# Patient Record
Sex: MALE | Race: WHITE | NOT HISPANIC OR LATINO | Employment: FULL TIME | URBAN - METROPOLITAN AREA
[De-identification: names, ages, dates, MRNs, and addresses within clinical notes are randomized per-mention and may not be internally consistent; named-entity substitution may affect disease eponyms.]

---

## 2017-01-05 ENCOUNTER — OFFICE VISIT (OUTPATIENT)
Dept: FAMILY MEDICINE | Facility: CLINIC | Age: 27
End: 2017-01-05
Payer: COMMERCIAL

## 2017-01-05 VITALS
HEART RATE: 86 BPM | RESPIRATION RATE: 20 BRPM | DIASTOLIC BLOOD PRESSURE: 72 MMHG | TEMPERATURE: 98.6 F | OXYGEN SATURATION: 97 % | WEIGHT: 212 LBS | HEIGHT: 71 IN | BODY MASS INDEX: 29.68 KG/M2 | SYSTOLIC BLOOD PRESSURE: 126 MMHG

## 2017-01-05 DIAGNOSIS — Z00.00 ENCOUNTER FOR ROUTINE ADULT HEALTH EXAMINATION WITHOUT ABNORMAL FINDINGS: Primary | ICD-10-CM

## 2017-01-05 DIAGNOSIS — Z23 NEED FOR VACCINATION: ICD-10-CM

## 2017-01-05 DIAGNOSIS — J30.1 SEASONAL ALLERGIC RHINITIS DUE TO POLLEN: ICD-10-CM

## 2017-01-05 DIAGNOSIS — Z23 NEED FOR PROPHYLACTIC VACCINATION AND INOCULATION AGAINST INFLUENZA: ICD-10-CM

## 2017-01-05 DIAGNOSIS — J45.30 MILD PERSISTENT ASTHMA WITHOUT COMPLICATION: ICD-10-CM

## 2017-01-05 PROCEDURE — 99395 PREV VISIT EST AGE 18-39: CPT | Mod: 25 | Performed by: FAMILY MEDICINE

## 2017-01-05 PROCEDURE — 90732 PPSV23 VACC 2 YRS+ SUBQ/IM: CPT | Performed by: FAMILY MEDICINE

## 2017-01-05 PROCEDURE — 90471 IMMUNIZATION ADMIN: CPT | Performed by: FAMILY MEDICINE

## 2017-01-05 PROCEDURE — 90472 IMMUNIZATION ADMIN EACH ADD: CPT | Performed by: FAMILY MEDICINE

## 2017-01-05 PROCEDURE — 90688 IIV4 VACCINE SPLT 0.5 ML IM: CPT | Performed by: FAMILY MEDICINE

## 2017-01-05 RX ORDER — MONTELUKAST SODIUM 10 MG/1
10 TABLET ORAL AT BEDTIME
Qty: 90 TABLET | Refills: 3 | Status: SHIPPED | OUTPATIENT
Start: 2017-01-05 | End: 2017-12-26

## 2017-01-05 RX ORDER — INHALER, ASSIST DEVICES
1 SPACER (EA) MISCELLANEOUS PRN
Qty: 1 EACH | Refills: 0 | Status: SHIPPED | OUTPATIENT
Start: 2017-01-05 | End: 2018-08-03

## 2017-01-05 RX ORDER — ALBUTEROL SULFATE 90 UG/1
1-2 AEROSOL, METERED RESPIRATORY (INHALATION) EVERY 6 HOURS PRN
Qty: 1 INHALER | Refills: 1 | Status: SHIPPED | OUTPATIENT
Start: 2017-01-05 | End: 2017-12-26

## 2017-01-05 ASSESSMENT — PAIN SCALES - GENERAL: PAINLEVEL: NO PAIN (0)

## 2017-01-05 NOTE — NURSING NOTE
"Chief Complaint   Patient presents with     Physical       Initial /72 mmHg  Pulse 86  Temp(Src) 98.6  F (37  C)  Resp 20  Ht 5' 11\" (1.803 m)  Wt 212 lb (96.163 kg)  BMI 29.58 kg/m2  SpO2 97% Estimated body mass index is 29.58 kg/(m^2) as calculated from the following:    Height as of this encounter: 5' 11\" (1.803 m).    Weight as of this encounter: 212 lb (96.163 kg).  BP completed using cuff size: regular    "

## 2017-01-05 NOTE — PROGRESS NOTES
Injectable Influenza Immunization Documentation    1.  Is the person to be vaccinated sick today?  No    2. Does the person to be vaccinated have an allergy to eggs or to a component of the vaccine?  No    3. Has the person to be vaccinated today ever had a serious reaction to influenza vaccine in the past?  No    4. Has the person to be vaccinated ever had Guillain-[place] syndrome?  No     Form completed by gino

## 2017-01-05 NOTE — PROGRESS NOTES
SUBJECTIVE:     CC: Garrison Hastings is an 26 year old male who presents for preventative health visit.     Healthy Habits:    Do you get at least three servings of calcium containing foods daily (dairy, green leafy vegetables, etc.)? yes    Amount of exercise or daily activities, outside of work: 2 times per week.     Problems taking medications regularly No    Medication side effects: No    Have you had an eye exam in the past two years? Yes, coming due.     Do you see a dentist twice per year? yes    Do you have sleep apnea, excessive snoring or daytime drowsiness?no        He has a history of allergies and asthma. He uses Albuterol rarely, does not need a daily inhaler.  However, does use Singulair daily for management of allergies (and probably some asthma benefit).    He uses his albuterol more when his seasonal allergies flare in fall, and he takes Zyrtec to help manage those symptoms as well         Today's PHQ-2 Score:   PHQ-2 ( 1999 Pfizer) 1/5/2017 6/6/2016   Q1: Little interest or pleasure in doing things 0 0   Q2: Feeling down, depressed or hopeless 0 0   PHQ-2 Score 0 0       Abuse: Current or Past(Physical, Sexual or Emotional)- No  Do you feel safe in your environment - Yes    Social History   Substance Use Topics     Smoking status: Never Smoker      Smokeless tobacco: Never Used     Alcohol Use: 0.0 oz/week     0 Standard drinks or equivalent per week      Comment: 1-2/week     The patient does not drink >3 drinks per day nor >7 drinks per week.      Reviewed orders with patient. Reviewed health maintenance and updated orders accordingly - Yes    All Histories reviewed and updated in Epic.      ROS:  C: NEGATIVE for fever, chills, change in weight  I: NEGATIVE for worrisome rashes, moles or lesions  E: NEGATIVE for vision changes or irritation  ENT: NEGATIVE for ear, mouth and throat problems  R: NEGATIVE for significant cough or SOB.  Asthma management as noted above.  CV: NEGATIVE for chest  "pain, palpitations or peripheral edema  GI: NEGATIVE for nausea, abdominal pain, heartburn, or change in bowel habits   male: negative for dysuria, hematuria, decreased urinary stream, erectile dysfunction, urethral discharge  M: NEGATIVE for significant arthralgias or myalgia  N: NEGATIVE for weakness, dizziness or paresthesias  P: NEGATIVE for changes in mood or affect    Problem list, Medication list, Allergies, and Medical/Social/Surgical histories reviewed in [facility] and updated as appropriate.  OBJECTIVE:     /72 mmHg  Pulse 86  Temp(Src) 98.6  F (37  C)  Resp 20  Ht 5' 11\" (1.803 m)  Wt 212 lb (96.163 kg)  BMI 29.58 kg/m2  SpO2 97%  EXAM:  GENERAL: healthy, alert and no distress  EYES: Eyes grossly normal to inspection, PERRL and conjunctivae and sclerae normal  HENT: ear canals and TM's normal, nose and mouth without ulcers or lesions - left ear has cerumen build up   NECK: no adenopathy, no asymmetry, masses, or scars and thyroid normal to palpation  RESP: lungs clear to auscultation - no rales, rhonchi or wheezes  CV: regular rate and rhythm, normal S1 S2, no S3 or S4, no murmur, click or rub, no peripheral edema and peripheral pulses strong  ABDOMEN: soft, nontender, no hepatosplenomegaly, no masses and bowel sounds normal  MS: no gross musculoskeletal defects noted, no edema  SKIN: no suspicious lesions or rashes  NEURO: Normal strength and tone, mentation intact and speech normal  PSYCH: mentation appears normal, affect normal/bright  LYMPH: normal ant/post cervical, supraclavicular lymph nodes     ASSESSMENT/PLAN:         ICD-10-CM    1. Encounter for routine adult health examination without abnormal findings Z00.00    2. Need for prophylactic vaccination and inoculation against influenza Z23 FLU VACCINE, 3 YRS +, IM (QUADRIVALENT W/PRESERVATIVES/MULTI-DOSE) [66632]     Vaccine Administration, Initial [52790]   3. Mild persistent asthma without complication J45.30 Spacer/Aero-Holding " "Chambers (AEROCHAMBER MAX W/FLOW-VU) MISC     albuterol (PROAIR HFA/PROVENTIL HFA/VENTOLIN HFA) 108 (90 BASE) MCG/ACT Inhaler     montelukast (SINGULAIR) 10 MG tablet   4. Seasonal allergic rhinitis due to pollen J30.1    5. Need for vaccination Z23 Pneumococcal vaccine 23 valent (Pneumovax) [12758]     Each additional admin.  (Right click and add QUANTITY)  [93699]       PLAN:  1. I attempted to remove myself with cerumen spoon but, was unable. Nurse flushed his left   2. Pneumonia vaccine given     COUNSELING:  Reviewed preventive health counseling, as reflected in patient instructions       reports that he has never smoked. He has never used smokeless tobacco.    Estimated body mass index is 29.58 kg/(m^2) as calculated from the following:    Height as of this encounter: 5' 11\" (1.803 m).    Weight as of this encounter: 212 lb (96.163 kg).   Weight management plan: Discussed healthy diet and exercise guidelines and patient will follow up in 12 months in clinic to re-evaluate.    Counseling Resources:  ATP IV Guidelines  Pooled Cohorts Equation Calculator  FRAX Risk Assessment  ICSI Preventive Guidelines  Dietary Guidelines for Americans, 2010  USDA's MyPlate  ASA Prophylaxis  Lung CA Screening    This document serves as a record of the services and decisions personally performed and made by Omid Dodge MD.It was created on his behalf by Bobbi Vincent,  trained medical scribes. The creation of this document is based the provider's statements to the medical scribe. January 5, 2017 2:52 PM    The information in this document, created by the medical scribe for me, accurately reflects the services I personally performed and the decisions made by me. I have reviewed and approved this document for accuracy.     Omid Dodge MD   [facility]        "

## 2017-01-05 NOTE — NURSING NOTE
Prior to injection verified patient identity using patient's name and date of birth.  Per orders of Dr. Dodge injection of Pneumovax h given by Milly Galvan MA. Patient instructed to remain in clinic for 20 minutes afterwards and to report any adverse reaction to me immediately.         Screening Questionnaire for Adult Immunization    Are you sick today?   No   Do you have allergies to medications, food, a vaccine component or latex?   No   Have you ever had a serious reaction after receiving a vaccination?   No   Do you have a long-term health problem with heart disease, lung disease, asthma, kidney disease, metabolic disease (e.g. diabetes), anemia, or other blood disorder?   No   Do you have cancer, leukemia, HIV/AIDS, or any other immune system problem?   No   In the past 3 months, have you taken medications that affect  your immune system, such as prednisone, other steroids, or anticancer drugs; drugs for the treatment of rheumatoid arthritis, Crohn s disease, or psoriasis; or have you had radiation treatments?   No   Have you had a seizure, or a brain or other nervous system problem?   No   During the past year, have you received a transfusion of blood or blood     products, or been given immune (gamma) globulin or antiviral drug?   No   For women: Are you pregnant or is there a chance you could become        pregnant during the next month?   No   Have you received any vaccinations in the past 4 weeks?   No     Immunization questionnaire answers were all negative.      MNVFC doesn't apply on this patient      Screening performed by Estela Galvan on 1/5/2017 at 4:22 PM.

## 2017-01-05 NOTE — Clinical Note
My Asthma Action Plan  Name: Garrison Hastings   YOB: 1990  Date: 1/5/2017   My doctor: Omid Dodge   My clinic: [facility]      My Control Medicine: none  My Rescue Medicine: Albuterol (Proair/Ventolin/Proventil) HFA     My Asthma Severity: mild persistent  Avoid your asthma triggers: upper respiratory infections and pollens        GREEN ZONE   Good Control    I feel good    No cough or wheeze    Can work, sleep and play without asthma symptoms       Take your asthma control medicine every day.     1. If exercise triggers your asthma, take your rescue medication    15 minutes before exercise or sports, and    During exercise if you have asthma symptoms  2. Spacer to use with inhaler: If you have a spacer, make sure to use it with your inhaler             YELLOW ZONE Getting Worse  I have ANY of these:    I do not feel good    Cough or wheeze    Chest feels tight    Wake up at night   1. Keep taking your Green Zone medications  2. Start taking your rescue medicine:    every 20 minutes for up to 1 hour. Then every 4 hours for 24-48 hours.  3. If you stay in the Yellow Zone for more than 12-24 hours, contact your doctor.  4. If you do not return to the Green Zone in 12-24 hours or you get worse, start taking your oral steroid medicine if prescribed by your provider.           RED ZONE Medical Alert - Get Help  I have ANY of these:    I feel awful    Medicine is not helping    Breathing getting harder    Trouble walking or talking    Nose opens wide to breathe       1. Take your rescue medicine NOW  2. If your provider has prescribed an oral steroid medicine, start taking it NOW  3. Call your doctor NOW  4. If you are still in the Red Zone after 20 minutes and you have not reached your doctor:    Take your rescue medicine again and    Call 911 or go to the emergency room right away    See your regular doctor within 2 weeks of an Emergency Room or Urgent Care visit for follow-up  treatment.        The above medication may be given at school or day care?: N/A (Adult Patient)  Child can carry and use inhaler(s) at school with approval of school nurse?: N/A (Adult Patient)    Electronically signed by: Omid Dodge, January 5, 2017    Annual Reminders:  Meet with Asthma Educator,  Flu Shot in the Fall, consider Pneumonia Vaccination for patients with asthma (aged 19 and older).    Pharmacy: [address]                     Asthma Triggers  How To Control Things That Make Your Asthma Worse    Triggers are things that make your asthma worse.  Look at the list below to help you find your triggers and what you can do about them.  You can help prevent asthma flare-ups by staying away from your triggers.      Trigger                                                          What you can do   Cigarette Smoke  Tobacco smoke can make asthma worse. Do not allow smoking in your home, car or around you.  Be sure no one smokes at a child s day care or school.  If you smoke, ask your health care provider for ways to help you quit.  Ask family members to quit too.  Ask your health care provider for a referral to Quit Plan to help you quit smoking, or call 3-785-594-PLAN.     Colds, Flu, Bronchitis  These are common triggers of asthma. Wash your hands often.  Don t touch your eyes, nose or mouth.  Get a flu shot every year.     Dust Mites  These are tiny bugs that live in cloth or carpet. They are too small to see. Wash sheets and blankets in hot water every week.   Encase pillows and mattress in dust mite proof covers.  Avoid having carpet if you can. If you have carpet, vacuum weekly.   Use a dust mask and HEPA vacuum.   Pollen and Outdoor Mold  Some people are allergic to trees, grass, or weed pollen, or molds. Try to keep your windows closed.  Limit time out doors when pollen count is high.   Ask you health care provider about taking medicine during  allergy season.     Animal Dander  Some people are allergic to skin flakes, urine or saliva from pets with fur or feathers. Keep pets with fur or feathers out of your home.    If you can t keep the pet outdoors, then keep the pet out of your bedroom.  Keep the bedroom door closed.  Keep pets off cloth furniture and away from stuffed toys.     Mice, Rats, and Cockroaches  Some people are allergic to the waste from these pests.   Cover food and garbage.  Clean up spills and food crumbs.  Store grease in the refrigerator.   Keep food out of the bedroom.   Indoor Mold  This can be a trigger if your home has high moisture. Fix leaking faucets, pipes, or other sources of water.   Clean moldy surfaces.  Dehumidify basement if it is damp and smelly.   Smoke, Strong Odors, and Sprays  These can reduce air quality. Stay away from strong odors and sprays, such as perfume, powder, hair spray, paints, smoke incense, paint, cleaning products, candles and new carpet.   Exercise or Sports  Some people with asthma have this trigger. Be active!  Ask your doctor about taking medicine before sports or exercise to prevent symptoms.    Warm up for 5-10 minutes before and after sports or exercise.     Other Triggers of Asthma  Cold air:  Cover your nose and mouth with a scarf.  Sometimes laughing or crying can be a trigger.  Some medicines and food can trigger asthma.

## 2017-01-05 NOTE — MR AVS SNAPSHOT
After Visit Summary   1/5/2017    Garrison Hastings    MRN: [id number]           Patient Information     Date Of Birth          1990        Visit Information        Provider Department      1/5/2017 2:30 PM Omid Dodge MD [facility]        Today's Diagnoses     Encounter for routine adult health examination without abnormal findings    -  1     Need for prophylactic vaccination and inoculation against influenza         Mild persistent asthma without complication         Seasonal allergic rhinitis due to pollen           Care Instructions      Preventive Health Recommendations  Male Ages 26 - 39    Yearly exam:             See your health care provider every year in order to  o   Review health changes.   o   Discuss preventive care.    o   Review your medicines if your doctor has prescribed any.    You should be tested each year for STDs (sexually transmitted diseases), if you re at risk.     After age 35, talk to your provider about cholesterol testing. If you are at risk for heart disease, have your cholesterol tested at least every 5 years.     If you are at risk for diabetes, you should have a diabetes test (fasting glucose).  Shots: Get a flu shot each year. Get a tetanus shot every 10 years.     Nutrition:    Eat at least 5 servings of fruits and vegetables daily.     Eat whole-grain bread, whole-wheat pasta and brown rice instead of white grains and rice.     Talk to your provider about Calcium and Vitamin D.     Lifestyle    Exercise for at least 150 minutes a week (30 minutes a day, 5 days a week). This will help you control your weight and prevent disease.     Limit alcohol to one drink per day.     No smoking.     Wear sunscreen to prevent skin cancer.     See your dentist every six months for an exam and cleaning.             Follow-ups after your visit        Who to contact     If you have questions or need follow up information about today's clinic visit or  "your schedule please contact [facility] directly at [phone].  Normal or non-critical lab and imaging results will be communicated to you by MyChart, letter or phone within 4 business days after the clinic has received the results. If you do not hear from us within 7 days, please contact the clinic through [employer] or phone. If you have a critical or abnormal lab result, we will notify you by phone as soon as possible.  Submit refill requests through [employer] or call your pharmacy and they will forward the refill request to us. Please allow 3 business days for your refill to be completed.          Additional Information About Your Visit        [employer] Information     [employer] lets you send messages to your doctor, view your test results, renew your prescriptions, schedule appointments and more. To sign up, go to [email or web address] . Click on \"Log in\" on the left side of the screen, which will take you to the Welcome page. Then click on \"Sign up Now\" on the right side of the page.     You will be asked to enter the access code listed below, as well as some personal information. Please follow the directions to create your username and password.     Your access code is: [email or web address]  Expires: 2017  4:03 PM     Your access code will  in 90 days. If you need help or a new code, please call your [facility] clinic or [phone].        Care EveryWhere ID     This is your Care EveryWhere ID. This could be used by other organizations to access your [facility] medical records  [id number]        Your Vitals Were     Pulse Temperature Respirations Height BMI (Body Mass Index) Pulse Oximetry    86 98.6  F (37  C) 20 5' 11\" (1.803 m) 29.58 kg/m2 97%       Blood Pressure from Last 3 Encounters:   17 126/72   16 136/74   12 140/69    Weight from Last 3 Encounters:   17 212 lb (96.163 kg)   16 210 lb (95.255 kg)   12 192 lb 12.8 oz (87.454 kg)              We " Performed the Following     Asthma Action Plan (AAP)     FLU VACCINE, 3 YRS +, IM (QUADRIVALENT W/PRESERVATIVES/MULTI-DOSE) [66896]     Vaccine Administration, Initial [35399]          Today's Medication Changes          These changes are accurate as of: 1/5/17  4:03 PM.  If you have any questions, ask your nurse or doctor.               Start taking these medicines.        Dose/Directions    AEROCHAMBER MAX W/FLOW-VU Misc   Used for:  Mild persistent asthma without complication   Started by:  Omid Dodge MD        Dose:  1 Device   1 Device as needed   Quantity:  1 each   Refills:  0         These medicines have changed or have updated prescriptions.        Dose/Directions    albuterol 108 (90 BASE) MCG/ACT Inhaler   Commonly known as:  PROAIR HFA/PROVENTIL HFA/VENTOLIN HFA   This may have changed:  how much to take   Used for:  Mild persistent asthma without complication   Changed by:  Omid Dodge MD        Dose:  1-2 puff   Inhale 1-2 puffs into the lungs every 6 hours as needed for shortness of breath / dyspnea or wheezing   Quantity:  1 Inhaler   Refills:  1         Stop taking these medicines if you haven't already. Please contact your care team if you have questions.     mometasone 110 MCG/INH inhaler   Commonly known as:  ASMANEX 30 METERED DOSES   Stopped by:  Omid Dodge MD                Where to get your medicines      These medications were sent to [facility] [address]   [address] , [facility] [address]     Phone:  [phone]    - albuterol 108 (90 BASE) MCG/ACT Inhaler  - montelukast 10 MG tablet      Some of these will need a paper prescription and others can be bought over the counter.  Ask your nurse if you have questions.     Bring a paper prescription for each of these medications    - AEROCHAMBER MAX W/FLOW-VU [facility]             Primary Care Provider Office Phone # Fax #    Omid Dodge [phone]  [phone]       [facility] [address] [place] [address]        Thank you!     Thank you for choosing [facility]  for your care. Our goal is always to provide you with excellent care. Hearing back from our patients is one way we can continue to improve our services. Please take a few minutes to complete the written survey that you may receive in the mail after your visit with us. Thank you!             Your Updated Medication List - Protect others around you: Learn how to safely use, store and throw away your medicines at www.disposemymeds.org.          This list is accurate as of: 1/5/17  4:03 PM.  Always use your most recent med list.                   Brand Name Dispense Instructions for use    AEROCHAMBER MAX W/FLOW-VU Misc     1 each    1 Device as needed       albuterol 108 (90 BASE) MCG/ACT Inhaler    PROAIR HFA/PROVENTIL HFA/VENTOLIN HFA    1 Inhaler    Inhale 1-2 puffs into the lungs every 6 hours as needed for shortness of breath / dyspnea or wheezing       cetirizine 10 MG tablet    zyrTEC     Take 10 mg by mouth daily       montelukast 10 MG tablet    SINGULAIR    90 tablet    Take 1 tablet (10 mg) by mouth At Bedtime

## 2017-01-06 ASSESSMENT — ASTHMA QUESTIONNAIRES: ACT_TOTALSCORE: 25

## 2017-12-11 ENCOUNTER — ALLIED HEALTH/NURSE VISIT (OUTPATIENT)
Dept: FAMILY MEDICINE | Facility: CLINIC | Age: 27
End: 2017-12-11
Payer: COMMERCIAL

## 2017-12-11 DIAGNOSIS — Z31.41 ENCOUNTER FOR FERTILITY TESTING: Primary | ICD-10-CM

## 2017-12-11 DIAGNOSIS — Z23 NEED FOR PROPHYLACTIC VACCINATION AND INOCULATION AGAINST INFLUENZA: ICD-10-CM

## 2017-12-11 PROCEDURE — 90471 IMMUNIZATION ADMIN: CPT

## 2017-12-11 PROCEDURE — 90688 IIV4 VACCINE SPLT 0.5 ML IM: CPT

## 2017-12-11 NOTE — PROGRESS NOTES

## 2017-12-11 NOTE — MR AVS SNAPSHOT
After Visit Summary   12/11/2017    Garrison Hastings    MRN: [id number]           Patient Information     Date Of Birth          1990        Visit Information        Provider Department      12/11/2017 11:15 AM NL FLOAT TEAM D [facility][note 3]        Today's Diagnoses     Encounter for fertility testing    -  1    Need for prophylactic vaccination and inoculation against influenza           Follow-ups after your visit        Future tests that were ordered for you today     Open Future Orders        Priority Expected Expires Ordered    Semen Analysis, Strict Morphology (JANNIE) Routine  3/30/2018 12/11/2017            Who to contact     If you have questions or need follow up information about today's clinic visit or your schedule please contact [facility] directly at [phone].  Normal or non-critical lab and imaging results will be communicated to you by OfferLoungehart, letter or phone within 4 business days after the clinic has received the results. If you do not hear from us within 7 days, please contact the clinic through OfferLoungehart or phone. If you have a critical or abnormal lab result, we will notify you by phone as soon as possible.  Submit refill requests through PROTEGO or call your pharmacy and they will forward the refill request to us. Please allow 3 business days for your refill to be completed.          Additional Information About Your Visit        MyChart Information     PROTEGO gives you secure access to your electronic health record. If you see a primary care provider, you can also send messages to your care team and make appointments. If you have questions, please call your primary care clinic.  If you do not have a primary care provider, please call [phone] and they will assist you.        Care EveryWhere ID     This is your Care EveryWhere ID. This could be used by other organizations to access your [place] medical records  [id number]         Blood  Pressure from Last 3 Encounters:   01/05/17 126/72   06/06/16 136/74   06/06/12 140/69    Weight from Last 3 Encounters:   01/05/17 212 lb (96.2 kg)   06/06/16 210 lb (95.3 kg)   06/06/12 192 lb 12.8 oz (87.5 kg)              We Performed the Following     FLU VACCINE, 3 YRS +, IM (QUADRIVALENT W/PRESERVATIVES/MULTI-DOSE) [10075]     Vaccine Administration, Initial [06567]        Primary Care Provider Office Phone # Fax #    Omidgina Dodge [phone] [address] [place] [address]        Equal Access to Services     JOANNA ORNELAS : Brando Schultz, wakristi muniz, qaybta kaalmada nelly, shannan gonzalez. So [facility] [phone].    ATENCIÓN: Si habla español, tiene a ramos disposición servicios gratuitos de asistencia lingüística. Llame al [phone].    We comply with applicable federal civil rights laws and [place] laws. We do not discriminate on the basis of race, color, national origin, age, disability, sex, sexual orientation, or gender identity.            Thank you!     Thank you for choosing [facility]  for your care. Our goal is always to provide you with excellent care. Hearing back from our patients is one way we can continue to improve our services. Please take a few minutes to complete the written survey that you may receive in the mail after your visit with us. Thank you!             Your Updated Medication List - Protect others around you: Learn how to safely use, store and throw away your medicines at www.disposemymeds.org.          This list is accurate as of: 12/11/17 12:46 PM.  Always use your most recent med list.                   Brand Name Dispense Instructions for use Diagnosis    AEROCHAMBER MAX W/FLOW-VU Misc     1 each    1 Device as needed    Mild persistent asthma without complication       albuterol 108 (90 BASE) MCG/ACT Inhaler    PROAIR HFA/PROVENTIL HFA/VENTOLIN HFA    1 Inhaler    Inhale  1-2 puffs into the lungs every 6 hours as needed for shortness of breath / dyspnea or wheezing    Mild persistent asthma without complication       cetirizine 10 MG tablet    zyrTEC     Take 10 mg by mouth daily        montelukast 10 MG tablet    SINGULAIR    90 tablet    Take 1 tablet (10 mg) by mouth At Bedtime    Mild persistent asthma without complication

## 2017-12-20 DIAGNOSIS — Z31.41 ENCOUNTER FOR FERTILITY TESTING: ICD-10-CM

## 2017-12-20 PROCEDURE — 89322 SEMEN ANAL STRICT CRITERIA: CPT

## 2017-12-26 ENCOUNTER — MYC REFILL (OUTPATIENT)
Dept: FAMILY MEDICINE | Facility: CLINIC | Age: 27
End: 2017-12-26

## 2017-12-26 DIAGNOSIS — J45.30 MILD PERSISTENT ASTHMA WITHOUT COMPLICATION: ICD-10-CM

## 2017-12-26 LAB
ABNORMAL SPERM: 88 MORPHOLOGY
ABSTINENCE DAYS: 7 DAYS (ref 2–7)
AGGLUTINATION: NO YES/NO
ANALYSIS TEMP - CENTIGRADE: 22 CENTIGRADE
CELL FRAGMENTS: NORMAL %
COLLECTION METHOD: NORMAL
COLLECTION SITE: NORMAL
CONSENT TO RELEASE TO PARTNER: YES
HEAD DEFECT: 83
IMMATURE SPERM: NORMAL %
IMMOTILE: 34 %
LAB RECEIPT TIME: NORMAL
LIQUEFIED: YES YES/NO
MIDPIECE DEFECT: 12
NON-PROGRESSIVE MOTILITY: 7 %
NORMAL SPERM: 12 % NORMAL FORMS (ref 4–?)
PROGRESSIVE MOTILITY: 59 % (ref 32–?)
ROUND CELLS: 0.2 MILLION/ML (ref ?–2)
SPECIMEN CONCENTRATION: 128 MILLION/ML (ref 15–?)
SPECIMEN PH: 7.2 PH (ref 7.2–?)
SPECIMEN TYPE: NORMAL
SPECIMEN VOL UR: 5.9 ML (ref 1.5–?)
TAIL DEFECT: 4
TIME OF ANALYSIS: NORMAL
TOTAL NUMBER: 755 MILLION (ref 39–?)
TOTAL PROGRESSIVE MOTILE: 445 MILLION (ref 15.6–?)
VISCOUS: NO YES/NO
VITALITY: NORMAL % (ref 58–?)
WBC SPECIMEN: NORMAL %

## 2017-12-26 RX ORDER — MONTELUKAST SODIUM 10 MG/1
10 TABLET ORAL AT BEDTIME
Qty: 90 TABLET | Refills: 0 | Status: SHIPPED | OUTPATIENT
Start: 2017-12-26 | End: 2018-04-09

## 2017-12-26 RX ORDER — ALBUTEROL SULFATE 90 UG/1
1-2 AEROSOL, METERED RESPIRATORY (INHALATION) EVERY 6 HOURS PRN
Qty: 1 INHALER | Refills: 0 | Status: SHIPPED | OUTPATIENT
Start: 2017-12-26 | End: 2018-04-09

## 2017-12-26 NOTE — PROGRESS NOTES
Magnus,  Your results all appear within normal limits.  Please let me know if you have any questions.    Sincerely,  Dr. Dodge

## 2017-12-26 NOTE — TELEPHONE ENCOUNTER
Message from Browserlinghart:  Original authorizing provider: Omid Dodge MD    Magnus Hastings would like a refill of the following medications:  albuterol (PROAIR HFA/PROVENTIL HFA/VENTOLIN HFA) 108 (90 BASE) MCG/ACT Inhaler [Omid Dodge MD]  montelukast (SINGULAIR) 10 MG tablet [Omid Dodge MD]    Preferred pharmacy: [address]     Comment:  I am about 1.5 weeks away from running out of my montelukast 10 MG and I'm going to have a hard time getting in for an appointment until the week of Jan 8ht-12 due to some work trade shows I am participating in. Could I get an extension/refill on my Montelukast and a refill on my inhaler please? I'd like to have an extra inhaler for my truck/traveling purposes. I'll be requesting an appointment for the week of the 8th-12th.

## 2017-12-27 ENCOUNTER — TELEPHONE (OUTPATIENT)
Dept: FAMILY MEDICINE | Facility: CLINIC | Age: 27
End: 2017-12-27

## 2017-12-28 ASSESSMENT — ASTHMA QUESTIONNAIRES: ACT_TOTALSCORE: 18

## 2018-01-08 ENCOUNTER — OFFICE VISIT (OUTPATIENT)
Dept: FAMILY MEDICINE | Facility: CLINIC | Age: 28
End: 2018-01-08
Payer: COMMERCIAL

## 2018-01-08 VITALS
HEART RATE: 74 BPM | BODY MASS INDEX: 30.1 KG/M2 | TEMPERATURE: 98.2 F | DIASTOLIC BLOOD PRESSURE: 64 MMHG | OXYGEN SATURATION: 100 % | RESPIRATION RATE: 18 BRPM | HEIGHT: 71 IN | SYSTOLIC BLOOD PRESSURE: 118 MMHG | WEIGHT: 215 LBS

## 2018-01-08 DIAGNOSIS — J45.30 MILD PERSISTENT ASTHMA WITHOUT COMPLICATION: ICD-10-CM

## 2018-01-08 DIAGNOSIS — Z00.01 ENCOUNTER FOR ROUTINE ADULT HEALTH EXAMINATION WITH ABNORMAL FINDINGS: Primary | ICD-10-CM

## 2018-01-08 DIAGNOSIS — J34.89 NASAL OBSTRUCTION: ICD-10-CM

## 2018-01-08 PROCEDURE — 99395 PREV VISIT EST AGE 18-39: CPT | Performed by: FAMILY MEDICINE

## 2018-01-08 ASSESSMENT — PAIN SCALES - GENERAL: PAINLEVEL: NO PAIN (0)

## 2018-01-08 NOTE — PROGRESS NOTES
SUBJECTIVE:   CC: Garrison Hastings is an 27 year old male who presents for preventative health visit.     Physical   Annual:     Getting at least 3 servings of Calcium per day::  Yes    Bi-annual eye exam::  Yes    Dental care twice a year::  NO    Sleep apnea or symptoms of sleep apnea::  None    Diet::  Low fat/cholesterol, Vegetarian/vegan and Gluten-free/reduced    Frequency of exercise::  1 day/week    Duration of exercise::  15-30 minutes    Taking medications regularly::  Yes    Additional concerns today::  YES            Feels like right side of nostril is plugged all the time.  No frequent nose bleeds.  Has been going on for a couple of years.        Today's PHQ-2 Score:   PHQ-2 ( 1999 Pfizer) 1/8/2018   Q1: Little interest or pleasure in doing things 0   Q2: Feeling down, depressed or hopeless 0   PHQ-2 Score 0   Q1: Little interest or pleasure in doing things Not at all   Q2: Feeling down, depressed or hopeless Not at all   PHQ-2 Score 0       Abuse: Current or Past(Physical, Sexual or Emotional)- No  Do you feel safe in your environment - Yes    Social History   Substance Use Topics     Smoking status: Never Smoker     Smokeless tobacco: Never Used     Alcohol use 0.0 oz/week     0 Standard drinks or equivalent per week      Comment: 1-2/week     Alcohol Use 1/8/2018   If you drink alcohol, do you typically have greater than 3 drinks per day OR greater than 7 drinks per week?   No   No flowsheet data found.      Last PSA: No results found for: PSA    Reviewed orders with patient. Reviewed health maintenance and updated orders accordingly - Yes  Labs reviewed in EPIC    Reviewed and updated as needed this visit by clinical staff  Tobacco  Allergies  Meds  Problems  Med Hx  Surg Hx  Fam Hx  Soc Hx          Reviewed and updated as needed this visit by Provider  Tobacco  Allergies  Meds  Problems  Med Hx  Surg Hx  Soc Hx           Review of Systems   Constitutional: Negative for chills, fatigue,  "fever and unexpected weight change.   HENT: Positive for congestion, rhinorrhea, sinus pain and sinus pressure. Negative for dental problem, ear pain, hearing loss, mouth sores, nosebleeds, postnasal drip, sore throat and trouble swallowing.    Eyes: Negative for pain and visual disturbance.   Respiratory: Positive for cough and shortness of breath. Negative for wheezing.         Asthma symptoms seem to be worsening over this past winter.  Patient seems to be using his albuterol more often.   Cardiovascular: Negative for chest pain, palpitations and peripheral edema.   Gastrointestinal: Negative for abdominal pain, constipation, diarrhea, heartburn, hematochezia, nausea and vomiting.   Endocrine: Negative for cold intolerance, heat intolerance and polyuria.   Genitourinary: Negative for decreased urine volume, difficulty urinating, discharge, dysuria, frequency, genital sores, hematuria, impotence, scrotal swelling, testicular pain and urgency.   Musculoskeletal: Negative for arthralgias, back pain, gait problem, joint swelling and myalgias.   Skin: Negative for rash.   Allergic/Immunologic: Negative for environmental allergies.   Neurological: Negative for dizziness, weakness, numbness, headaches and paresthesias.   Psychiatric/Behavioral: Negative for mood changes and sleep disturbance. The patient is not nervous/anxious.          OBJECTIVE:   /64  Pulse 74  Temp 98.2  F (36.8  C) (Temporal)  Resp 18  Ht 5' 11\" (1.803 m)  Wt 215 lb (97.5 kg)  SpO2 100%  BMI 29.99 kg/m2    Physical Exam   Constitutional: He is oriented to person, place, and time. He appears well-developed and well-nourished. He is active. No distress.   HENT:   Head: Normocephalic and atraumatic.   Right Ear: Hearing, tympanic membrane, external ear and ear canal normal.   Left Ear: Hearing, tympanic membrane, external ear and ear canal normal.   Mouth/Throat: Uvula is midline, oropharynx is clear and moist and mucous membranes are " normal. No oral lesions. No oropharyngeal exudate.   Eyes: Conjunctivae, EOM and lids are normal. Pupils are equal, round, and reactive to light. Right eye exhibits no discharge. Left eye exhibits no discharge. No scleral icterus.   Neck: Normal range of motion. Neck supple. No tracheal deviation present. No thyroid mass and no thyromegaly present.   Cardiovascular: Normal rate, regular rhythm, S1 normal, S2 normal, normal heart sounds and normal pulses.  Exam reveals no S3 and no S4.    No murmur heard.  Pulmonary/Chest: Effort normal and breath sounds normal. No respiratory distress. He has no wheezes. He has no rales.   Abdominal: Soft. Bowel sounds are normal. He exhibits no distension and no mass. There is no hepatosplenomegaly. There is no tenderness. There is no guarding.   Musculoskeletal: Normal range of motion. He exhibits no edema or deformity.   Lymphadenopathy:     He has no cervical adenopathy.        Right: No supraclavicular adenopathy present.        Left: No supraclavicular adenopathy present.   Neurological: He is alert and oriented to person, place, and time. He has normal strength and normal reflexes. He exhibits normal muscle tone.   Skin: Skin is warm and dry. No lesion and no rash noted.   Psychiatric: He has a normal mood and affect. His speech is normal. Judgment and thought content normal. Cognition and memory are normal.       ASSESSMENT/PLAN:       ICD-10-CM    1. Encounter for routine adult health examination with abnormal findings Z00.01    2. Mild persistent asthma without complication J45.30 fluticasone furoate (ARNUITY ELLIPTA) 100 MCG/ACT AEPB inhalation powder   3. Nasal obstruction J34.89    Today we added Arnuity Ellipta to his asthma management.  We will see if this helps his symptoms.  He should follow-up on this issue in 1-2 months.    Referral to Dr. Joseph, ENT, for evaluation of his clogged right nostril and sinus issues.    COUNSELING:   Reviewed preventive health  "counseling, as reflected in patient instructions       Regular exercise       Healthy diet/nutrition       Vision screening           reports that he has never smoked. He has never used smokeless tobacco.      Estimated body mass index is 29.99 kg/(m^2) as calculated from the following:    Height as of this encounter: 5' 11\" (1.803 m).    Weight as of this encounter: 215 lb (97.5 kg).   Weight management plan: Discussed healthy diet and exercise guidelines and patient will follow up in 12 months in clinic to re-evaluate.    Counseling Resources:  ATP IV Guidelines  Pooled Cohorts Equation Calculator  FRAX Risk Assessment  ICSI Preventive Guidelines  Dietary Guidelines for Americans, 2010  USDA's MyPlate  ASA Prophylaxis  Lung CA Screening    Omid Dodge MD  [facility]  Answers for HPI/ROS submitted by the patient on 1/8/2018   PHQ-2 Score: 0  "

## 2018-01-08 NOTE — NURSING NOTE
"Chief Complaint   Patient presents with     Physical     Male pe        Initial /64  Pulse 74  Temp 98.2  F (36.8  C) (Temporal)  Resp 18  Ht 5' 11\" (1.803 m)  Wt 215 lb (97.5 kg)  SpO2 100%  BMI 29.99 kg/m2 Estimated body mass index is 29.99 kg/(m^2) as calculated from the following:    Height as of this encounter: 5' 11\" (1.803 m).    Weight as of this encounter: 215 lb (97.5 kg).  Medication Reconciliation: complete    "

## 2018-01-08 NOTE — MR AVS SNAPSHOT
After Visit Summary   1/8/2018    Garrison Hastings    MRN: [id number]           Patient Information     Date Of Birth          1990        Visit Information        Provider Department      1/8/2018 1:30 PM Omid Dodge MD [facility]        Today's Diagnoses     Encounter for routine adult health examination with abnormal findings    -  1    Mild persistent asthma without complication        Nasal obstruction           Follow-ups after your visit        Your next 10 appointments already scheduled     Feb 01, 2018  8:45 AM CST   [facility]   [facility]    [address] [place] [id number] [phone]              Who to contact     If you have questions or need follow up information about today's clinic visit or your schedule please contact [facility] directly at [phone].  Normal or non-critical lab and imaging results will be communicated to you by MyChart, letter or phone within 4 business days after the clinic has received the results. If you do not hear from us within 7 days, please contact the clinic through MyChart or phone. If you have a critical or abnormal lab result, we will notify you by phone as soon as possible.  Submit refill requests through CyberVision Text or call your pharmacy and they will forward the refill request to us. Please allow 3 business days for your refill to be completed.          Additional Information About Your Visit        MyChart Information     CyberVision Text gives you secure access to your electronic health record. If you see a primary care provider, you can also send messages to your care team and make appointments. If you have questions, please call your primary care clinic.  If you do not have a primary care provider, please call [phone] and they will assist you.        Care EveryWhere ID     This is your Care EveryWhere ID. This could be  "used by other organizations to access your [place] medical records  [id number]        Your Vitals Were     Pulse Temperature Respirations Height Pulse Oximetry BMI (Body Mass Index)    74 98.2  F (36.8  C) (Temporal) 18 5' 11\" (1.803 m) 100% 29.99 kg/m2       Blood Pressure from Last 3 Encounters:   01/08/18 118/64   01/05/17 126/72   06/06/16 136/74    Weight from Last 3 Encounters:   01/08/18 215 lb (97.5 kg)   01/05/17 212 lb (96.2 kg)   06/06/16 210 lb (95.3 kg)              We Performed the Following     Asthma Action Plan (AAP)          Today's Medication Changes          These changes are accurate as of: 1/8/18 11:59 PM.  If you have any questions, ask your nurse or doctor.               Start taking these medicines.        Dose/Directions    fluticasone furoate 100 MCG/ACT Aepb inhalation powder   Commonly known as:  ARNUITY ELLIPTA   Used for:  Mild persistent asthma without complication   Started by:  Omid Dodge MD        Dose:  1 puff   Inhale 1 puff into the lungs daily   Quantity:  1 each   Refills:  1            Where to get your medicines      These medications were sent to [facility] [place] [address] [facility]   [address] [place] [address] [address] [facility] [address]     Phone:  [phone]     fluticasone furoate 100 MCG/ACT Aepb inhalation powder                Primary Care Provider Office Phone # Fax #    Omid Dodge [phone]       [facility]   [facility] [address]        Equal Access to Services     [facility]: Hadii dimitry ku hadasho Soomaali, waaxda luqadaha, qaybta kaalmada adeegyaingrid, waxay omi doherty . So [facility] [phone].    ATENCIÓN: Si habla español, tiene a ramos disposición servicios gratuitos de asistencia lingüística. Llame al [phone].    We comply with applicable federal civil rights laws and [place] laws. We do not discriminate on the basis of race, color, national origin, age, disability, sex, sexual " orientation, or gender identity.            Thank you!     Thank you for choosing [facility]  for your care. Our goal is always to provide you with excellent care. Hearing back from our patients is one way we can continue to improve our services. Please take a few minutes to complete the written survey that you may receive in the mail after your visit with us. Thank you!             Your Updated Medication List - Protect others around you: Learn how to safely use, store and throw away your medicines at www.disposemymeds.org.          This list is accurate as of: 1/8/18 11:59 PM.  Always use your most recent med list.                   Brand Name Dispense Instructions for use Diagnosis    AEROCHAMBER MAX W/FLOW-VU Misc     1 each    1 Device as needed    Mild persistent asthma without complication       albuterol 108 (90 BASE) MCG/ACT Inhaler    PROAIR HFA/PROVENTIL HFA/VENTOLIN HFA    1 Inhaler    Inhale 1-2 puffs into the lungs every 6 hours as needed for shortness of breath / dyspnea or wheezing    Mild persistent asthma without complication       cetirizine 10 MG tablet    zyrTEC     Take 10 mg by mouth daily        fluticasone furoate 100 MCG/ACT Aepb inhalation powder    ARNUITY ELLIPTA    1 each    Inhale 1 puff into the lungs daily    Mild persistent asthma without complication       montelukast 10 MG tablet    SINGULAIR    90 tablet    Take 1 tablet (10 mg) by mouth At Bedtime    Mild persistent asthma without complication

## 2018-01-09 ASSESSMENT — ASTHMA QUESTIONNAIRES: ACT_TOTALSCORE: 21

## 2018-01-10 ASSESSMENT — ENCOUNTER SYMPTOMS
FREQUENCY: 0
ABDOMINAL PAIN: 0
HEARTBURN: 0
TROUBLE SWALLOWING: 0
DIARRHEA: 0
COUGH: 1
WHEEZING: 0
FEVER: 0
SINUS PAIN: 1
PARESTHESIAS: 0
HEMATURIA: 0
CONSTIPATION: 0
UNEXPECTED WEIGHT CHANGE: 0
PALPITATIONS: 0
WEAKNESS: 0
DIZZINESS: 0
NAUSEA: 0
FATIGUE: 0
SLEEP DISTURBANCE: 0
HEMATOCHEZIA: 0
ARTHRALGIAS: 0
RHINORRHEA: 1
BACK PAIN: 0
EYE PAIN: 0
NERVOUS/ANXIOUS: 0
NUMBNESS: 0
VOMITING: 0
SINUS PRESSURE: 1
DIFFICULTY URINATING: 0
MYALGIAS: 0
SORE THROAT: 0
DYSURIA: 0
HEADACHES: 0
JOINT SWELLING: 0
CHILLS: 0
SHORTNESS OF BREATH: 1

## 2018-01-31 NOTE — PROGRESS NOTES
ENT Consultation    Garrison Hastings is a 27 year old male who is seen in consultation at the request of Dr.Jeremy Dodge    History of Present Illness - Garrison Hastings is a 27 year old male with concerns right side nasal congestion. Patient has a difficult time breathing through his nose for the last 3-5 years. Explains that the right nostril seems smaller than the left to him. He denies drainage from the nose. Patient does have allergies, but has not used any nasal sprays. He has experienced some nasal trauma in the past. Normal sense of smell.      Past Medical History -   Past Medical History:   Diagnosis Date     Mild persistent asthma without complication 1/5/2017     Seasonal allergic rhinitis due to pollen 1/5/2017       Current Medications -   Current Outpatient Prescriptions:      fluticasone furoate (ARNUITY ELLIPTA) 100 MCG/ACT AEPB inhalation powder, Inhale 1 puff into the lungs daily, Disp: 1 each, Rfl: 1     albuterol (PROAIR HFA/PROVENTIL HFA/VENTOLIN HFA) 108 (90 BASE) MCG/ACT Inhaler, Inhale 1-2 puffs into the lungs every 6 hours as needed for shortness of breath / dyspnea or wheezing, Disp: 1 Inhaler, Rfl: 0     montelukast (SINGULAIR) 10 MG tablet, Take 1 tablet (10 mg) by mouth At Bedtime, Disp: 90 tablet, Rfl: 0     Spacer/Aero-Holding Chambers (AEROCHAMBER MAX W/FLOW-VU) MISC, 1 Device as needed, Disp: 1 each, Rfl: 0     cetirizine (ZYRTEC) 10 MG tablet, Take 10 mg by mouth daily, Disp: , Rfl:     Allergies - No Known Allergies    Social History -   Social History     Social History     Marital status:      Spouse name: N/A     Number of children: 0     Years of education: N/A     Social History Main Topics     Smoking status: Never Smoker     Smokeless tobacco: Never Used     Alcohol use 0.0 oz/week     0 Standard drinks or equivalent per week      Comment: 1-2/week     Drug use: No     Sexual activity: Yes     Partners: Female     Birth control/ protection: None     Other Topics  Concern     Not on file     Social History Narrative       Family History -   Family History   Problem Relation Age of Onset     Coronary Artery Disease No family hx of      Hyperlipidemia No family hx of      DIABETES No family hx of      Colon Cancer No family hx of      Prostate Cancer No family hx of        Review of Systems - As per HPI and PMHx, otherwise review of system review of the head and neck negative.    Physical Exam  Pulse 65  Wt 98.4 kg (217 lb)  SpO2 99%  BMI 30.27 kg/m2  BMI: Body mass index is 30.27 kg/(m^2).    General - The patient is well nourished and well developed, and appears to have good nutritional status.  Alert and oriented to person and place, answers questions and cooperates with examination appropriately.    SKIN - No suspicious lesions or rashes.  Respiration - No respiratory distress.  Head and Face - Normocephalic and atraumatic, with no gross asymmetry noted of the contour of the facial features.  The facial nerve is intact, with strong symmetric movements.    Voice and Breathing - The patient was breathing comfortably without the use of accessory muscles. The patients voice was clear and strong, and had appropriate pitch and quality.    Ears - Bilateral pinna and EACs with normal appearing overlying skin. Tympanic membrane intact with good mobility on pneumatic otoscopy bilaterally. Bony landmarks of the ossicular chain are normal. The tympanic membranes are normal in appearance. No retraction, perforation, or masses.  No fluid or purulence was seen in the external canal or the middle ear.     Eyes - Extraocular movements intact.  Sclera were not icteric or injected, conjunctiva were pink and moist.    Mouth - Examination of the oral cavity showed pink, healthy oral mucosa. No lesions or ulcerations noted.  The tongue was mobile and midline, and the dentition were in good condition.      Throat - The walls of the oropharynx were smooth, pink, moist, symmetric, and had no  lesions or ulcerations.  The tonsillar pillars and soft palate were symmetric.  The uvula was midline on elevation.    Neck - Normal midline excursion of the laryngotracheal complex during swallowing.  Full range of motion on passive movement.  Palpation of the occipital, submental, submandibular, internal jugular chain, and supraclavicular nodes did not demonstrate any abnormal lymph nodes or masses.  The carotid pulse was palpable bilaterally.  Palpation of the thyroid was soft and smooth, with no nodules or goiter appreciated.  The trachea was mobile and midline.    Nose - External contour is symmetric, no gross deflection or scars.  Nasal mucosa is pink and moist with no abnormal mucus.  The septum was deviated severely to the right, turbinates are enlarged bilaterally.     Neuro - Nonfocal neuro exam is normal, CN 2 through 12 intact, normal gait and muscle tone.      Performed in clinic today:  To further evaluate the nasal cavity, I performed rigid nasal endoscopy.  I first sprayed the nasal cavity bilaterally with a mix of lidocaine and neosynephrine.  I then began on the left side using a 2.7mm, 30 degree rigid nasal endoscope.  The septum was deviated severely to the right and the nasal airway was obstructed.  There was a small nasal polyp noted. The left middle turbinate and middle meatus were clearly visualized.  Looking up, the olfactory cleft was unobstructed.  Going further back, the sphenoethmoid recess was normal in appearance, with healthy appearing mucosa on the face of the sphenoid.  The nasopharynx was unremarkable, and the eustachian tube opening on this side was unobstructed.    I then turned my attention to the right side.  Once again, the septum was deviated severely to the right, and the airway was obstructed.  No abnormal secretions, purulence, polyps were noted.  The right middle turbinate and middle meatus were clearly visualized.  Looking up, the olfactory cleft was unobstructed. Going  further back the right sphenoethmoid recess was normal in appearance, and eustachian tube opening was unobstructed.  Red - [id number] Mytamed      Assessment/Plan - Garrison Hastings is a 27 year old male with a deviated septum, enlarged turbinates, and nasal obstruction. Based on the physical exam and history, my recommendation is for  septoplasty with turbinate reduction.  I counseled the patient on the risks of surgery, including infection, bleeding, risks of general anesthesia, the risk of failure of the surgery to relieve nasal obstruction, and the possibility of alteration of the appearance of the external nose.  They understood and wished to proceed to scheduling.    Patient should return as instructed for a post op recheck.    He will not need a hearing test at his next appointment.      This document serves as a record of the services and decisions personally performed and made by Dr. Vicente Joseph MD. It was created on his behalf by Tianna Barr, a trained medical scribe. The creation of this document is based the provider's statements to the medical scribe.  Tianna aBrr 9:45 AM 2018    Provider:   The information in this document, created by the medical scribe for me, accurately reflects the services I personally performed and the decisions made by me. I have reviewed and approved this document for accuracy prior to leaving the patient care area.  Dr. Vicente Joseph MD 9:45 AM 2018    Vicente Joseph MD    Please schedule for surgery, pre op H&P, and post ops.      Patient Name:  Garrison Hastings ([id number]).   :  1990 Gender:  male  Patient Type:  Same Day Surgery    Surgeon:  Vicente Joseph M.D.    Procedures:    Septoplasty - 45 minutes, Submucosal Resection of the Turbinates - 30 minutes     Diagnosis:     Nasal obstruction  Hypertrophy of nasal turbinates  Deviated nasal septum  Special instruments/supplies/Vendor:    Anesthesia:  General

## 2018-02-01 ENCOUNTER — OFFICE VISIT (OUTPATIENT)
Dept: OTOLARYNGOLOGY | Facility: CLINIC | Age: 28
End: 2018-02-01
Payer: COMMERCIAL

## 2018-02-01 VITALS — OXYGEN SATURATION: 99 % | WEIGHT: 217 LBS | BODY MASS INDEX: 30.27 KG/M2 | HEART RATE: 65 BPM

## 2018-02-01 DIAGNOSIS — J34.3 HYPERTROPHY OF NASAL TURBINATES: ICD-10-CM

## 2018-02-01 DIAGNOSIS — J34.2 DEVIATED NASAL SEPTUM: ICD-10-CM

## 2018-02-01 DIAGNOSIS — J34.89 NASAL OBSTRUCTION: Primary | ICD-10-CM

## 2018-02-01 PROCEDURE — 99204 OFFICE O/P NEW MOD 45 MIN: CPT | Mod: 25 | Performed by: OTOLARYNGOLOGY

## 2018-02-01 PROCEDURE — 31231 NASAL ENDOSCOPY DX: CPT | Performed by: OTOLARYNGOLOGY

## 2018-02-01 NOTE — NURSING NOTE
"Chief Complaint   Patient presents with     Consult     Referring Dr.Jeremy Dodge     Sinus Problem       Initial Pulse 65  Wt 98.4 kg (217 lb)  SpO2 99%  BMI 30.27 kg/m2 Estimated body mass index is 30.27 kg/(m^2) as calculated from the following:    Height as of 1/8/18: 1.803 m (5' 11\").    Weight as of this encounter: 98.4 kg (217 lb).  Medication Reconciliation: complete  "

## 2018-02-01 NOTE — MR AVS SNAPSHOT
After Visit Summary   2/1/2018    Garrison Hastings    MRN: [id number]           Patient Information     Date Of Birth          1990        Visit Information        Provider Department      2/1/2018 8:45 AM Vicente Joseph MD [facility]         Follow-ups after your visit        Who to contact     If you have questions or need follow up information about today's clinic visit or your schedule please contact [facility] directly at [phone].  Normal or non-critical lab and imaging results will be communicated to you by [facility], letter or phone within 4 business days after the clinic has received the results. If you do not hear from us within 7 days, please contact the clinic through [employer] or phone. If you have a critical or abnormal lab result, we will notify you by phone as soon as possible.  Submit refill requests through [employer] or call your pharmacy and they will forward the refill request to us. Please allow 3 business days for your refill to be completed.          Additional Information About Your Visit        [facility] Information     [employer] gives you secure access to your electronic health record. If you see a primary care provider, you can also send messages to your care team and make appointments. If you have questions, please call your primary care clinic.  If you do not have a primary care provider, please call [phone] and they will assist you.        Care EveryWhere ID     This is your Care EveryWhere ID. This could be used by other organizations to access your [place] medical records  [id number]        Your Vitals Were     Pulse Pulse Oximetry BMI (Body Mass Index)             65 99% 30.27 kg/m2          Blood Pressure from Last 3 Encounters:   01/08/18 118/64   01/05/17 126/72   06/06/16 136/74    Weight from Last 3 Encounters:   02/01/18 98.4 kg (217 lb)   01/08/18 97.5 kg (215 lb)   01/05/17 96.2 kg (212 lb)              Today, you had  the following     No orders found for display       Primary Care Provider Office Phone # Fax #    Omid Herminio Dodge [phone] [address] [place] [address]        Equal Access to Services     JOANNA ORNELAS : Hadii aad ku hadpego Soomaali, waaxda luqadaha, qaybta kaalmada adeegyada, shannan roachn mery hurley laRejilaurent gonzalez. So [facility] [phone].    ATENCIÓN: Si habla español, tiene a ramos disposición servicios gratuitos de asistencia lingüística. Llame al [phone].    We comply with applicable federal civil rights laws and [place] laws. We do not discriminate on the basis of race, color, national origin, age, disability, sex, sexual orientation, or gender identity.            Thank you!     Thank you for choosing [facility]  for your care. Our goal is always to provide you with excellent care. Hearing back from our patients is one way we can continue to improve our services. Please take a few minutes to complete the written survey that you may receive in the mail after your visit with us. Thank you!             Your Updated Medication List - Protect others around you: Learn how to safely use, store and throw away your medicines at www.disposemymeds.org.          This list is accurate as of 2/1/18  9:27 AM.  Always use your most recent med list.                   Brand Name Dispense Instructions for use Diagnosis    AEROCHAMBER MAX W/FLOW-VU Misc     1 each    1 Device as needed    Mild persistent asthma without complication       albuterol 108 (90 BASE) MCG/ACT Inhaler    PROAIR HFA/PROVENTIL HFA/VENTOLIN HFA    1 Inhaler    Inhale 1-2 puffs into the lungs every 6 hours as needed for shortness of breath / dyspnea or wheezing    Mild persistent asthma without complication       cetirizine 10 MG tablet    zyrTEC     Take 10 mg by mouth daily        fluticasone furoate 100 MCG/ACT Aepb inhalation powder    ARNUITY ELLIPTA    1 each    Inhale 1 puff into the lungs  daily    Mild persistent asthma without complication       montelukast 10 MG tablet    SINGULAIR    90 tablet    Take 1 tablet (10 mg) by mouth At Bedtime    Mild persistent asthma without complication

## 2018-02-01 NOTE — LETTER
2/1/2018         RE: Garrison Hastings  [address]   [facility] [address]        Dear Colleague,    Thank you for referring your patient, Garrison Hastings, to the [facility]. Please see a copy of my visit note below.    ENT Consultation    Garrison Hastings is a 27 year old male who is seen in consultation at the request of Dr.Jeremy Dodge    History of Present Illness - Garrison Hastings is a 27 year old male with concerns right side nasal congestion. Patient has a difficult time breathing through his nose for the last 3-5 years. Explains that the right nostril seems smaller than the left to him. He denies drainage from the nose. Patient does have allergies, but has not used any nasal sprays. He has experienced some nasal trauma in the past. Normal sense of smell.      Past Medical History -   Past Medical History:   Diagnosis Date     Mild persistent asthma without complication 1/5/2017     Seasonal allergic rhinitis due to pollen 1/5/2017       Current Medications -   Current Outpatient Prescriptions:      fluticasone furoate (ARNUITY ELLIPTA) 100 MCG/ACT AEPB inhalation powder, Inhale 1 puff into the lungs daily, Disp: 1 each, Rfl: 1     albuterol (PROAIR HFA/PROVENTIL HFA/VENTOLIN HFA) 108 (90 BASE) MCG/ACT Inhaler, Inhale 1-2 puffs into the lungs every 6 hours as needed for shortness of breath / dyspnea or wheezing, Disp: 1 Inhaler, Rfl: 0     montelukast (SINGULAIR) 10 MG tablet, Take 1 tablet (10 mg) by mouth At Bedtime, Disp: 90 tablet, Rfl: 0     Spacer/Aero-Holding Chambers (AEROCHAMBER MAX W/FLOW-VU) MISC, 1 Device as needed, Disp: 1 each, Rfl: 0     cetirizine (ZYRTEC) 10 MG tablet, Take 10 mg by mouth daily, Disp: , Rfl:     Allergies - No Known Allergies    Social History -   Social History     Social History     Marital status:      Spouse name: N/A     Number of children: 0     Years of education: N/A     Social History Main Topics     Smoking status: Never Smoker      Smokeless tobacco: Never Used     Alcohol use 0.0 oz/week     0 Standard drinks or equivalent per week      Comment: 1-2/week     Drug use: No     Sexual activity: Yes     Partners: Female     Birth control/ protection: None     Other Topics Concern     Not on file     Social History Narrative       Family History -   Family History   Problem Relation Age of Onset     Coronary Artery Disease No family hx of      Hyperlipidemia No family hx of      DIABETES No family hx of      Colon Cancer No family hx of      Prostate Cancer No family hx of        Review of Systems - As per HPI and PMHx, otherwise review of system review of the head and neck negative.    Physical Exam  Pulse 65  Wt 98.4 kg (217 lb)  SpO2 99%  BMI 30.27 kg/m2  BMI: Body mass index is 30.27 kg/(m^2).    General - The patient is well nourished and well developed, and appears to have good nutritional status.  Alert and oriented to person and place, answers questions and cooperates with examination appropriately.    SKIN - No suspicious lesions or rashes.  Respiration - No respiratory distress.  Head and Face - Normocephalic and atraumatic, with no gross asymmetry noted of the contour of the facial features.  The facial nerve is intact, with strong symmetric movements.    Voice and Breathing - The patient was breathing comfortably without the use of accessory muscles. The patients voice was clear and strong, and had appropriate pitch and quality.    Ears - Bilateral pinna and EACs with normal appearing overlying skin. Tympanic membrane intact with good mobility on pneumatic otoscopy bilaterally. Bony landmarks of the ossicular chain are normal. The tympanic membranes are normal in appearance. No retraction, perforation, or masses.  No fluid or purulence was seen in the external canal or the middle ear.     Eyes - Extraocular movements intact.  Sclera were not icteric or injected, conjunctiva were pink and moist.    Mouth - Examination of the oral  cavity showed pink, healthy oral mucosa. No lesions or ulcerations noted.  The tongue was mobile and midline, and the dentition were in good condition.      Throat - The walls of the oropharynx were smooth, pink, moist, symmetric, and had no lesions or ulcerations.  The tonsillar pillars and soft palate were symmetric.  The uvula was midline on elevation.    Neck - Normal midline excursion of the laryngotracheal complex during swallowing.  Full range of motion on passive movement.  Palpation of the occipital, submental, submandibular, internal jugular chain, and supraclavicular nodes did not demonstrate any abnormal lymph nodes or masses.  The carotid pulse was palpable bilaterally.  Palpation of the thyroid was soft and smooth, with no nodules or goiter appreciated.  The trachea was mobile and midline.    Nose - External contour is symmetric, no gross deflection or scars.  Nasal mucosa is pink and moist with no abnormal mucus.  The septum was deviated severely to the right, turbinates are enlarged bilaterally.     Neuro - Nonfocal neuro exam is normal, CN 2 through 12 intact, normal gait and muscle tone.      Performed in clinic today:  To further evaluate the nasal cavity, I performed rigid nasal endoscopy.  I first sprayed the nasal cavity bilaterally with a mix of lidocaine and neosynephrine.  I then began on the left side using a 2.7mm, 30 degree rigid nasal endoscope.  The septum was deviated severely to the right and the nasal airway was obstructed.  There was a small nasal polyp noted. The left middle turbinate and middle meatus were clearly visualized.  Looking up, the olfactory cleft was unobstructed.  Going further back, the sphenoethmoid recess was normal in appearance, with healthy appearing mucosa on the face of the sphenoid.  The nasopharynx was unremarkable, and the eustachian tube opening on this side was unobstructed.    I then turned my attention to the right side.  Once again, the septum was  deviated severely to the right, and the airway was obstructed.  No abnormal secretions, purulence, polyps were noted.  The right middle turbinate and middle meatus were clearly visualized.  Looking up, the olfactory cleft was unobstructed. Going further back the right sphenoethmoid recess was normal in appearance, and eustachian tube opening was unobstructed.  Red - [id number] tamed      Assessment/Plan - Garrison Hastings is a 27 year old male with a deviated septum, enlarged turbinates, and nasal obstruction. Based on the physical exam and history, my recommendation is for  septoplasty with turbinate reduction.  I counseled the patient on the risks of surgery, including infection, bleeding, risks of general anesthesia, the risk of failure of the surgery to relieve nasal obstruction, and the possibility of alteration of the appearance of the external nose.  They understood and wished to proceed to scheduling.    Patient should return as instructed for a post op recheck.    He will not need a hearing test at his next appointment.      This document serves as a record of the services and decisions personally performed and made by Dr. Vicente Joseph MD. It was created on his behalf by Tianna Barr, a trained medical scribe. The creation of this document is based the provider's statements to the medical scribe.  Tianna Barr 9:45 AM 2018    Provider:   The information in this document, created by the medical scribe for me, accurately reflects the services I personally performed and the decisions made by me. I have reviewed and approved this document for accuracy prior to leaving the patient care area.  Dr. Vicente Joseph MD 9:45 AM 2018    Vicente Joseph MD    Please schedule for surgery, pre op H&P, and post ops.      Patient Name:  Garrison Hastings ([id number]).   :  1990 Gender:  male  Patient Type:  Same Day Surgery    Surgeon:  Vicente Joseph M.D.    Procedures:    Septoplasty - 45  minutes, Submucosal Resection of the Turbinates - 30 minutes     Diagnosis:     Nasal obstruction  Hypertrophy of nasal turbinates  Deviated nasal septum  Special instruments/supplies/Vendor:    Anesthesia:  General          Again, thank you for allowing me to participate in the care of your patient.        Sincerely,        Vicente Joseph MD, MD

## 2018-02-02 ENCOUNTER — TELEPHONE (OUTPATIENT)
Dept: OTOLARYNGOLOGY | Facility: CLINIC | Age: 28
End: 2018-02-02

## 2018-02-02 NOTE — TELEPHONE ENCOUNTER
Called to schedules surgery. He states he is not ready at this time, he would like to find out cost first. I told him I would call in a couple weeks if I don't hear from him before.

## 2018-04-09 ENCOUNTER — MYC REFILL (OUTPATIENT)
Dept: FAMILY MEDICINE | Facility: CLINIC | Age: 28
End: 2018-04-09

## 2018-04-09 DIAGNOSIS — J45.30 MILD PERSISTENT ASTHMA WITHOUT COMPLICATION: ICD-10-CM

## 2018-04-09 NOTE — TELEPHONE ENCOUNTER
"Requested Prescriptions   Pending Prescriptions Disp Refills     albuterol (PROAIR HFA/PROVENTIL HFA/VENTOLIN HFA) 108 (90 BASE) MCG/ACT Inhaler 1 Inhaler 0     Sig: Inhale 1-2 puffs into the lungs every 6 hours as needed for shortness of breath / dyspnea or wheezing    Asthma Maintenance Inhalers - Anticholinergics Passed    4/9/2018  3:29 PM       Passed - Patient is age 12 years or older       Passed - Asthma control assessment score within normal limits in last 6 months    Please review ACT score.          Passed - Recent (6 mo) or future (30 days) visit within the authorizing provider's specialty    Patient had office visit in the last 6 months or has a visit in the next 30 days with authorizing provider or within the authorizing provider's specialty.  See \"Patient Info\" tab in inbasket, or \"Choose Columns\" in Meds & Orders section of the refill encounter.            montelukast (SINGULAIR) 10 MG tablet 90 tablet 0     Sig: Take 1 tablet (10 mg) by mouth At Bedtime    Leukotriene Inhibitors Protocol Passed    4/9/2018  3:29 PM       Passed - Patient is age 12 or older    If patient is under 16, ok to refill using age based dosing.          Passed - Asthma control assessment score within normal limits in last 6 months    Please review ACT score.          Passed - Recent (6 mo) or future (30 days) visit within the authorizing provider's specialty    Patient had office visit in the last 6 months or has a visit in the next 30 days with authorizing provider or within the authorizing provider's specialty.  See \"Patient Info\" tab in inbasket, or \"Choose Columns\" in Meds & Orders section of the refill encounter.              Last Written Prescription Date:  12/26/17  Last Fill Quantity: 1 inhaler,  # refills: 0   [place] Office Visit with [facility] or [facility] prescribing provider:  1/8/18   Future Office Visit:     Singulair 10 MG       Last Written Prescription Date:  12/26/17  Last Fill Quantity: 90,   # refills: " 0  Last Office Visit: 1/8/18  Future Office visit:

## 2018-04-09 NOTE — TELEPHONE ENCOUNTER
Message from [facility] MDhart:  Original authorizing provider: Omid Dodge MD    Magnus Hastings would like a refill of the following medications:  albuterol (PROAIR HFA/PROVENTIL HFA/VENTOLIN HFA) 108 (90 BASE) MCG/ACT Inhaler [Omid Dodge MD]  montelukast (SINGULAIR) 10 MG tablet [Omid Dodge MD]    Preferred pharmacy: [address]     Comment:  Guilherme Anne - I would like to request that the montelukast 10 MG tablets be renewed as well as a renewal on my inhaler so that I can keep them in a couple of locations. I did end up using the Annuity and it did help initially, but I found that not using I'm under better control now. If I could get a renewal on my Singulair, that seems to be working for me. Thank you!

## 2018-04-10 RX ORDER — MONTELUKAST SODIUM 10 MG/1
10 TABLET ORAL AT BEDTIME
Qty: 90 TABLET | Refills: 0 | Status: SHIPPED | OUTPATIENT
Start: 2018-04-10 | End: 2018-04-19

## 2018-04-10 RX ORDER — ALBUTEROL SULFATE 90 UG/1
1-2 AEROSOL, METERED RESPIRATORY (INHALATION) EVERY 6 HOURS PRN
Qty: 1 INHALER | Refills: 0 | Status: SHIPPED | OUTPATIENT
Start: 2018-04-10 | End: 2018-07-30

## 2018-04-10 NOTE — TELEPHONE ENCOUNTER
Patient was told at his appointment 3 months ago that he needed a 1-2 month follow-up for his asthma management.  I do not see that he has completed this yet.  I refilled his medications as requested, but he needs to be seen for follow-up on his asthma management.  Will have staff notify patient.    Omid Dodge MD

## 2018-04-10 NOTE — TELEPHONE ENCOUNTER
Called [facility] and relayed message per Dr Dodge.  An appt is scheduled for [facility] with Dr Dodge for an Asthma recheck.

## 2018-04-10 NOTE — TELEPHONE ENCOUNTER
LOV notes from 1/8/18 are not complete.  Routing to PCP for further advice.    Milly Saavedra RN

## 2018-04-18 ENCOUNTER — MYC MEDICAL ADVICE (OUTPATIENT)
Dept: FAMILY MEDICINE | Facility: CLINIC | Age: 28
End: 2018-04-18

## 2018-04-18 DIAGNOSIS — J30.1 CHRONIC SEASONAL ALLERGIC RHINITIS DUE TO POLLEN: Primary | ICD-10-CM

## 2018-04-18 DIAGNOSIS — J45.30 MILD PERSISTENT ASTHMA WITHOUT COMPLICATION: ICD-10-CM

## 2018-04-19 RX ORDER — MONTELUKAST SODIUM 10 MG/1
10 TABLET ORAL AT BEDTIME
Qty: 90 TABLET | Refills: 2 | Status: SHIPPED | OUTPATIENT
Start: 2018-04-19 | End: 2018-08-03 | Stop reason: SINTOL

## 2018-07-16 ENCOUNTER — MYC REFILL (OUTPATIENT)
Dept: FAMILY MEDICINE | Facility: CLINIC | Age: 28
End: 2018-07-16

## 2018-07-16 DIAGNOSIS — J30.1 CHRONIC SEASONAL ALLERGIC RHINITIS DUE TO POLLEN: ICD-10-CM

## 2018-07-16 DIAGNOSIS — J45.30 MILD PERSISTENT ASTHMA WITHOUT COMPLICATION: ICD-10-CM

## 2018-07-16 RX ORDER — MONTELUKAST SODIUM 10 MG/1
10 TABLET ORAL AT BEDTIME
Qty: 90 TABLET | Refills: 2 | Status: CANCELLED | OUTPATIENT
Start: 2018-07-16

## 2018-07-16 NOTE — TELEPHONE ENCOUNTER
Message from [employer]:  Original authorizing provider: Omid Dodge MD    Magn[facility] Hastings would like a refill of the following medications:  albuterol (PROAIR HFA/PROVENTIL HFA/VENTOLIN HFA) 108 (90 Base) MCG/ACT Inhaler [Omid Dodge MD]  montelukast (SINGULAIR) 10 MG tablet [Omid Dodge MD]    Preferred pharmacy: [address]     Comment:  Requesting renewal of these medications. As long as I am taking the Montelukast 10 MG I'm not having many issues with my Asthma. Want to have a back-up inhaler on hand.

## 2018-07-23 RX ORDER — ALBUTEROL SULFATE 90 UG/1
1-2 AEROSOL, METERED RESPIRATORY (INHALATION) EVERY 6 HOURS PRN
Qty: 1 INHALER | Refills: 0 | Status: CANCELLED | OUTPATIENT
Start: 2018-07-23

## 2018-07-27 ENCOUNTER — MYC REFILL (OUTPATIENT)
Dept: FAMILY MEDICINE | Facility: CLINIC | Age: 28
End: 2018-07-27

## 2018-07-27 DIAGNOSIS — J45.30 MILD PERSISTENT ASTHMA WITHOUT COMPLICATION: ICD-10-CM

## 2018-07-27 RX ORDER — ALBUTEROL SULFATE 90 UG/1
1-2 AEROSOL, METERED RESPIRATORY (INHALATION) EVERY 6 HOURS PRN
Qty: 1 INHALER | Refills: 0 | Status: CANCELLED | OUTPATIENT
Start: 2018-07-27

## 2018-07-27 NOTE — TELEPHONE ENCOUNTER
Message from MyChart:  Original authorizing provider: Omid Dodge MD    [facility] Hastings would like a refill of the following medications:  albuterol (PROAIR HFA/PROVENTIL HFA/VENTOLIN HFA) 108 (90 Base) MCG/ACT Inhaler [Omid Dodge MD]    [facility] [place] [address] [facility]     Comment:

## 2018-07-27 NOTE — TELEPHONE ENCOUNTER
"Last Written Prescription Date:  4/10/2018  Last Fill Quantity: 1,  # refills: 0   Last office visit: 1/8/2018 with prescribing provider:  Dr. Dodge   Future Office Visit:    Requested Prescriptions   Pending Prescriptions Disp Refills     albuterol (PROAIR HFA/PROVENTIL HFA/VENTOLIN HFA) 108 (90 Base) MCG/ACT Inhaler 1 Inhaler 0     Sig: Inhale 1-2 puffs into the lungs every 6 hours as needed for shortness of breath / dyspnea or wheezing    Asthma Maintenance Inhalers - Anticholinergics Failed    7/27/2018  1:53 PM       Failed - Asthma control assessment score within normal limits in last 6 months    Please review ACT score.          Failed - Recent (6 mo) or future (30 days) visit within the authorizing provider's specialty    Patient had office visit in the last 6 months or has a visit in the next 30 days with authorizing provider or within the authorizing provider's specialty.  See \"Patient Info\" tab in inbasket, or \"Choose Columns\" in Meds & Orders section of the refill encounter.           Passed - Patient is age 12 years or older          "

## 2018-07-30 RX ORDER — ALBUTEROL SULFATE 90 UG/1
1-2 AEROSOL, METERED RESPIRATORY (INHALATION) EVERY 6 HOURS PRN
Qty: 1 INHALER | Refills: 0 | Status: SHIPPED | OUTPATIENT
Start: 2018-07-30 | End: 2018-08-03

## 2018-07-30 NOTE — TELEPHONE ENCOUNTER
Patient has been scheduled for Friday, August 3rd.  He is needing a refill of the Albuterol before the appointment.  Can he have this refilled?  He did send a request via [employer].      Thank You,  Cathy Patten  Patient Representative, [facility] [facility]

## 2018-07-30 NOTE — TELEPHONE ENCOUNTER
Routing to scheduling for completion patient can see me in any 15 minute appointment opening or a doctor only slot.    Omid Dodge MD

## 2018-07-30 NOTE — TELEPHONE ENCOUNTER
ACT Total Scores 1/5/2017 12/27/2017 1/8/2018   ACT TOTAL SCORE (Goal Greater than or Equal to 20) 25 18 21   In the past 12 months, how many times did you visit the emergency room for your asthma without being admitted to the hospital? 0 0 0   In the past 12 months, how many times were you hospitalized overnight because of your asthma? 0 0 0     Dr. Dodge, pt asking for a refill before visit with you Friday. I tried to pull it up, but it says you have pended an order...................................ANTONIO Zelaya

## 2018-08-03 ENCOUNTER — OFFICE VISIT (OUTPATIENT)
Dept: FAMILY MEDICINE | Facility: CLINIC | Age: 28
End: 2018-08-03
Payer: COMMERCIAL

## 2018-08-03 VITALS
BODY MASS INDEX: 29.12 KG/M2 | TEMPERATURE: 98.8 F | SYSTOLIC BLOOD PRESSURE: 118 MMHG | HEART RATE: 66 BPM | RESPIRATION RATE: 18 BRPM | DIASTOLIC BLOOD PRESSURE: 70 MMHG | HEIGHT: 71 IN | OXYGEN SATURATION: 98 % | WEIGHT: 208 LBS

## 2018-08-03 DIAGNOSIS — J45.30 MILD PERSISTENT ASTHMA WITHOUT COMPLICATION: ICD-10-CM

## 2018-08-03 PROCEDURE — 99214 OFFICE O/P EST MOD 30 MIN: CPT | Performed by: FAMILY MEDICINE

## 2018-08-03 RX ORDER — INHALER, ASSIST DEVICES
1 SPACER (EA) MISCELLANEOUS PRN
Qty: 1 EACH | Refills: 0 | Status: SHIPPED | OUTPATIENT
Start: 2018-08-03 | End: 2024-09-06

## 2018-08-03 RX ORDER — ALBUTEROL SULFATE 90 UG/1
1-2 AEROSOL, METERED RESPIRATORY (INHALATION) EVERY 6 HOURS PRN
Qty: 1 INHALER | Refills: 1 | Status: SHIPPED | OUTPATIENT
Start: 2018-08-03 | End: 2019-11-21

## 2018-08-03 ASSESSMENT — PAIN SCALES - GENERAL: PAINLEVEL: NO PAIN (0)

## 2018-08-03 NOTE — MR AVS SNAPSHOT
"              After Visit Summary   8/3/2018    Garrison Hastings    MRN: [id number]           Patient Information     Date Of Birth          1990        Visit Information        Provider Department      8/3/2018 10:30 AM Omid Dodge MD [facility]        Today's Diagnoses     Mild persistent asthma without complication           Follow-ups after your visit        Who to contact     If you have questions or need follow up information about today's clinic visit or your schedule please contact [facility] directly at [phone].  Normal or non-critical lab and imaging results will be communicated to you by [employer], letter or phone within 4 business days after the clinic has received the results. If you do not hear from us within 7 days, please contact the clinic through b3 biot or phone. If you have a critical or abnormal lab result, we will notify you by phone as soon as possible.  Submit refill requests through [employer] or call your pharmacy and they will forward the refill request to us. Please allow 3 business days for your refill to be completed.          Additional Information About Your Visit        MyChart Information     [employer] gives you secure access to your electronic health record. If you see a primary care provider, you can also send messages to your care team and make appointments. If you have questions, please call your primary care clinic.  If you do not have a primary care provider, please call [phone] and they will assist you.        Care EveryWhere ID     This is your Care EveryWhere ID. This could be used by other organizations to access your [place] medical records  [id number]        Your Vitals Were     Pulse Temperature Respirations Height Pulse Oximetry BMI (Body Mass Index)    66 98.8  F (37.1  C) (Temporal) 18 5' 11\" (1.803 m) 98% 29.01 kg/m2       Blood Pressure from Last 3 Encounters:   08/03/18 118/70   01/08/18 118/64   01/05/17 " 126/72    Weight from Last 3 Encounters:   08/03/18 208 lb (94.3 kg)   02/01/18 217 lb (98.4 kg)   01/08/18 215 lb (97.5 kg)              Today, you had the following     No orders found for display         Today's Medication Changes          These changes are accurate as of 8/3/18  2:15 PM.  If you have any questions, ask your nurse or doctor.               Start taking these medicines.        Dose/Directions    fluticasone furoate 200 MCG/ACT inhalation powder   Commonly known as:  ARNUITY ELLIPTA   Used for:  Mild persistent asthma without complication   Replaces:  fluticasone furoate 100 MCG/ACT Aepb inhalation powder   Started by:  Omid Dodge MD        Dose:  1 puff   Inhale 1 puff into the lungs daily   Quantity:  1 Inhaler   Refills:  1         Stop taking these medicines if you haven't already. Please contact your care team if you have questions.     fluticasone furoate 100 MCG/ACT Aepb inhalation powder   Commonly known as:  ARNUITY ELLIPTA   Replaced by:  fluticasone furoate 200 MCG/ACT inhalation powder   Stopped by:  Omid Dodge MD           montelukast 10 MG tablet   Commonly known as:  SINGULAIR   Stopped by:  Omid Dodge MD                Where to get your medicines      These medications were sent to [facility] [place] [address] [address]  [address] [facility] [address]     Phone:  [phone]     albuterol 108 (90 Base) MCG/ACT Inhaler    fluticasone furoate 200 MCG/ACT inhalation powder         Some of these will need a paper prescription and others can be bought over the counter.  Ask your nurse if you have questions.     Bring a paper prescription for each of these medications     AEROCHAMBER MAX W/FLOW-VU [facility]                Primary Care Provider Office Phone # Fax #    Omid Dodge [phone]       [place] [address]        Equal Access to Services     JOANNA ORNELAS AH: Brando raygoza  sotero Schultz, wafranciscoda luqadaha, qaybta kaaladi villegas, shannan orianain hayaan nirmalafrancis ginomireille laeziodaija lisa. So [facility] [phone].    ATENCIÓN: Si habla español, tiene a ramos disposición servicios gratuitos de asistencia lingüística. Lissa al [phone].    We comply with applicable federal civil rights laws and [place] laws. We do not discriminate on the basis of race, color, national origin, age, disability, sex, sexual orientation, or gender identity.            Thank you!     Thank you for choosing [facility]  for your care. Our goal is always to provide you with excellent care. Hearing back from our patients is one way we can continue to improve our services. Please take a few minutes to complete the written survey that you may receive in the mail after your visit with us. Thank you!             Your Updated Medication List - Protect others around you: Learn how to safely use, store and throw away your medicines at www.disposemymeds.org.          This list is accurate as of 8/3/18  2:15 PM.  Always use your most recent med list.                   Brand Name Dispense Instructions for use Diagnosis    AEROCHAMBER MAX W/FLOW-VU Misc     1 each    1 Device as needed    Mild persistent asthma without complication       albuterol 108 (90 Base) MCG/ACT Inhaler    PROAIR HFA/PROVENTIL HFA/VENTOLIN HFA    1 Inhaler    Inhale 1-2 puffs into the lungs every 6 hours as needed for shortness of breath / dyspnea or wheezing    Mild persistent asthma without complication       cetirizine 10 MG tablet    zyrTEC     Take 10 mg by mouth daily        fluticasone furoate 200 MCG/ACT inhalation powder    ARNUITY ELLIPTA    1 Inhaler    Inhale 1 puff into the lungs daily    Mild persistent asthma without complication

## 2018-08-03 NOTE — PROGRESS NOTES
SUBJECTIVE:   Garrison Hastings is a 28 year old male who presents to clinic today for the following health issues:      Asthma Follow-Up    Was ACT completed today?    Yes    ACT Total Scores 8/3/2018   ACT TOTAL SCORE (Goal Greater than or Equal to 20) -   In the past 12 months, how many times did you visit the emergency room for your asthma without being admitted to the hospital? 0   In the past 12 months, how many times were you hospitalized overnight because of your asthma? 0       Recent asthma triggers that patient is dealing with: pollens and humidity        Amount of exercise or physical activity: 4-5 days/week for an average of 30-45 minutes    Problems taking medications regularly: No    Medication side effects: none    Diet: regular (no restrictions)          Problem list and histories reviewed & adjusted, as indicated.  Additional history: as documented      Reviewed and updated as needed this visit by clinical staff  Tobacco  Allergies  Meds  Problems  Soc Hx      Reviewed and updated as needed this visit by Provider  Allergies  Meds  Problems         Patient here today to discuss worsening asthma symptoms.  Patient is not currently on a controller medication.  He notes that he gets some mild benefit with albuterol inhaler.  He is not using a spacer device.  He feels that at nighttime as soon as he lays down his asthma gets worse.  He works with a local wood chip  during the daytime and states that his symptoms are overall okay and he does not feel that they interfere with his work.  He is not sleeping well at night due to his asthma symptoms.  He does not feel he is having any true shortness of breath or bad dyspnea on exertion or emergent symptoms.    Patient is on Singulair for asthma management and states that he does not really uses for seasonal allergies.  He does take Zyrtec daily for his seasonal allergies.  He notes that he may be getting aggressive symptoms with his use of  "Singulair.  We looked this up and it is a known side effect.    Patient has no fevers, chills, nausea or vomiting.    Review of his chart notes that 7 months ago, he saw me for similar symptoms and I started him on Arnuity Ellipta 100 mcg daily.  He stated that this medication did help his asthma symptoms improve and after 1-2 months, he discontinued the medication.  This is a medication that he is interested in trying again after I reviewed his chart with him and discussed his asthma management over the past 7 months.    ROS:  10 point ROS of systems including Constitutional, Eyes, HENT, Respiratory, Cardiovascular, Gastroenterology, Genitourinary, Integumentary, Muscularskeletal, Psychiatric were all negative except for pertinent positives noted in my HPI.     OBJECTIVE:   /70  Pulse 66  Temp 98.8  F (37.1  C) (Temporal)  Resp 18  Ht 5' 11\" (1.803 m)  Wt 208 lb (94.3 kg)  SpO2 98%  BMI 29.01 kg/m2  Body mass index is 29.01 kg/(m^2).  Physical Exam   Constitutional: He appears well-developed and well-nourished.   Cardiovascular: Normal rate, regular rhythm, S1 normal, S2 normal and normal heart sounds.    No murmur heard.  Pulmonary/Chest: Effort normal. No respiratory distress. He has decreased breath sounds (Throughout.  Patient notes that he does feel winded and lightheaded after trying to breathe deeply.). He has no wheezes. He has no rhonchi. He has no rales.   Neurological: He is alert.       ASSESSMENT/PLAN:       ICD-10-CM    1. Mild persistent asthma without complication J45.30 albuterol (PROAIR HFA/PROVENTIL HFA/VENTOLIN HFA) 108 (90 Base) MCG/ACT Inhaler     Spacer/Aero-Holding Chambers (AEROCHAMBER MAX W/FLOW-VU) MISC     fluticasone furoate (ARNUITY ELLIPTA) 200 MCG/ACT inhalation powder     DISCONTINUED: fluticasone furoate (ARNUITY ELLIPTA) 100 MCG/ACT AEPB inhalation powder     PLAN:  1.  Patient is displaying poorly controlled asthma today.  He is asthma control testing score was less " than 20 and he is having issues with nighttime symptoms.  We will restart him on Arnuity Ellipta, but on the 200 mcg dosing as we will discontinue his Singulair today.  2.  We will give patient a AeroChamber spacer device.  I discussed and reviewed how this device helps with his albuterol inhaler use.  3.  We discussed recommendations for albuterol use.  Patient should not exceed 2 uses a week, or need more than 2 inhalers a year.  Anything more than this is a sign that patient needs adjustment in controller medications.  Patient understands this recommendation.     Follow up with Provider -4 weeks for recheck on his asthma management.    Omid Dodge MD   [facility]

## 2018-11-06 DIAGNOSIS — J45.30 MILD PERSISTENT ASTHMA WITHOUT COMPLICATION: ICD-10-CM

## 2018-11-08 NOTE — TELEPHONE ENCOUNTER
"Prescription approved per RN refill protocol.  Tina Moya RN, BSN        Fluticasone furoate  Last Written Prescription Date:  8/3/2018  Last Fill Quantity: 1,  # refills: 1   Last office visit: 8/3/2018 with prescribing provider:  8/3/2018   Future Office Visit:   Next 5 appointments (look out 90 days)     Nov 12, 2018 11:30 AM CST   [facility] Omid Dodge MD   [facility]    [address] [place] [id number] [phone]                 ACT Total Scores 12/27/2017 1/8/2018 8/3/2018   ACT TOTAL SCORE (Goal Greater than or Equal to 20) 18 21 -   In the past 12 months, how many times did you visit the emergency room for your asthma without being admitted to the hospital? 0 0 0   In the past 12 months, how many times were you hospitalized overnight because of your asthma? 0 0 0       Requested Prescriptions   Pending Prescriptions Disp Refills     fluticasone furoate (ARNUITY ELLIPTA) 200 MCG/ACT inhaler 3 each 3     Sig: Inhale 1 puff into the lungs daily    Inhaled Steroids Protocol Failed    11/6/2018  1:56 PM       Failed - Asthma control assessment score within normal limits in last 6 months    Please review ACT score.          Passed - Patient is age 12 or older       Passed - Recent (6 mo) or future (30 days) visit within the authorizing provider's specialty    Patient had office visit in the last 6 months or has a visit in the next 30 days with authorizing provider or within the authorizing provider's specialty.  See \"Patient Info\" tab in inbasket, or \"Choose Columns\" in Meds & Orders section of the refill encounter.              Tina Moya RN on 11/8/2018 at 1:54 PM    "

## 2018-11-12 ENCOUNTER — OFFICE VISIT (OUTPATIENT)
Dept: FAMILY MEDICINE | Facility: CLINIC | Age: 28
End: 2018-11-12
Payer: COMMERCIAL

## 2018-11-12 VITALS
RESPIRATION RATE: 18 BRPM | WEIGHT: 216 LBS | HEART RATE: 88 BPM | BODY MASS INDEX: 30.24 KG/M2 | OXYGEN SATURATION: 98 % | HEIGHT: 71 IN | TEMPERATURE: 98.5 F

## 2018-11-12 DIAGNOSIS — J45.30 MILD PERSISTENT ASTHMA WITHOUT COMPLICATION: ICD-10-CM

## 2018-11-12 PROCEDURE — 99213 OFFICE O/P EST LOW 20 MIN: CPT | Performed by: FAMILY MEDICINE

## 2018-11-12 ASSESSMENT — PAIN SCALES - GENERAL: PAINLEVEL: NO PAIN (0)

## 2018-11-12 NOTE — LETTER
My Asthma Action Plan  Name: Garrison Hastings   YOB: 1990  Date: 11/20/2018   My doctor: Omid Dodge MD   My clinic: [facility]        My Control Medicine:   My Rescue Medicine:    My Asthma Severity: mild persistent  Avoid your asthma triggers: smoke, pollens and animal dander  upper respiratory infections  animal dander  strong odors and fumes  exercise or sports            GREEN ZONE   Good Control    I feel good    No cough or wheeze    Can work, sleep and play without asthma symptoms       Take your asthma control medicine every day.     1. If exercise triggers your asthma, take your rescue medication    15 minutes before exercise or sports, and    During exercise if you have asthma symptoms  2. Spacer to use with inhaler: If you have a spacer, make sure to use it with your inhaler             YELLOW ZONE Getting Worse  I have ANY of these:    I do not feel good    Cough or wheeze    Chest feels tight    Wake up at night   1. Keep taking your Green Zone medications  2. Start taking your rescue medicine:    every 20 minutes for up to 1 hour. Then every 4 hours for 24-48 hours.  3. If you stay in the Yellow Zone for more than 12-24 hours, contact your doctor.  4. If you do not return to the Green Zone in 12-24 hours or you get worse, start taking your oral steroid medicine if prescribed by your provider.           RED ZONE Medical Alert - Get Help  I have ANY of these:    I feel awful    Medicine is not helping    Breathing getting harder    Trouble walking or talking    Nose opens wide to breathe       1. Take your rescue medicine NOW  2. If your provider has prescribed an oral steroid medicine, start taking it NOW  3. Call your doctor NOW  4. If you are still in the Red Zone after 20 minutes and you have not reached your doctor:    Take your rescue medicine again and    Call 911 or go to the emergency room right away    See your regular doctor within 2 weeks of an  Emergency Room or Urgent Care visit for follow-up treatment.          Annual Reminders:  Meet with Asthma Educator,  Flu Shot in the Fall, consider Pneumonia Vaccination for patients with asthma (aged 19 and older).    Pharmacy: [place] [address] [address]                      Asthma Triggers  How To Control Things That Make Your Asthma Worse    Triggers are things that make your asthma worse.  Look at the list below to help you find your triggers and what you can do about them.  You can help prevent asthma flare-ups by staying away from your triggers.      Trigger                                                          What you can do   Cigarette Smoke  Tobacco smoke can make asthma worse. Do not allow smoking in your home, car or around you.  Be sure no one smokes at a child s day care or school.  If you smoke, ask your health care provider for ways to help you quit.  Ask family members to quit too.  Ask your health care provider for a referral to Quit Plan to help you quit smoking, or call 7-756-367-PLAN.     Colds, Flu, Bronchitis  These are common triggers of asthma. Wash your hands often.  Don t touch your eyes, nose or mouth.  Get a flu shot every year.     Dust Mites  These are tiny bugs that live in cloth or carpet. They are too small to see. Wash sheets and blankets in hot water every week.   Encase pillows and mattress in dust mite proof covers.  Avoid having carpet if you can. If you have carpet, vacuum weekly.   Use a dust mask and HEPA vacuum.   Pollen and Outdoor Mold  Some people are allergic to trees, grass, or weed pollen, or molds. Try to keep your windows closed.  Limit time out doors when pollen count is high.   Ask you health care provider about taking medicine during allergy season.     Animal Dander  Some people are allergic to skin flakes, urine or saliva from pets with fur or feathers. Keep pets with fur or feathers out of your home.    If you can t keep  the pet outdoors, then keep the pet out of your bedroom.  Keep the bedroom door closed.  Keep pets off cloth furniture and away from stuffed toys.     Mice, Rats, and Cockroaches  Some people are allergic to the waste from these pests.   Cover food and garbage.  Clean up spills and food crumbs.  Store grease in the refrigerator.   Keep food out of the bedroom.   Indoor Mold  This can be a trigger if your home has high moisture. Fix leaking faucets, pipes, or other sources of water.   Clean moldy surfaces.  Dehumidify basement if it is damp and smelly.   Smoke, Strong Odors, and Sprays  These can reduce air quality. Stay away from strong odors and sprays, such as perfume, powder, hair spray, paints, smoke incense, paint, cleaning products, candles and new carpet.   Exercise or Sports  Some people with asthma have this trigger. Be active!  Ask your doctor about taking medicine before sports or exercise to prevent symptoms.    Warm up for 5-10 minutes before and after sports or exercise.     Other Triggers of Asthma  Cold air:  Cover your nose and mouth with a scarf.  Sometimes laughing or crying can be a trigger.  Some medicines and food can trigger asthma.

## 2018-11-12 NOTE — MR AVS SNAPSHOT
"              After Visit Summary   11/12/2018    Garrison Hastings    MRN: [id number]           Patient Information     Date Of Birth          1990        Visit Information        Provider Department      11/12/2018 11:30 AM Omid Dodge MD [facility]        Today's Diagnoses     Mild persistent asthma without complication           Follow-ups after your visit        Who to contact     If you have questions or need follow up information about today's clinic visit or your schedule please contact [facility] directly at [phone].  Normal or non-critical lab and imaging results will be communicated to you by Muzy[employer], letter or phone within 4 business days after the clinic has received the results. If you do not hear from us within 7 days, please contact the clinic through [employer] or phone. If you have a critical or abnormal lab result, we will notify you by phone as soon as possible.  Submit refill requests through [employer] or call your pharmacy and they will forward the refill request to us. Please allow 3 business days for your refill to be completed.          Additional Information About Your Visit        MyChart Information     [employer] gives you secure access to your electronic health record. If you see a primary care provider, you can also send messages to your care team and make appointments. If you have questions, please call your primary care clinic.  If you do not have a primary care provider, please call [phone] and they will assist you.        Care EveryWhere ID     This is your Care EveryWhere ID. This could be used by other organizations to access your [facility] medical records  [id number]        Your Vitals Were     Pulse Temperature Respirations Height Pulse Oximetry BMI (Body Mass Index)    88 98.5  F (36.9  C) (Temporal) 18 5' 11\" (1.803 m) 98% 30.13 kg/m2       Blood Pressure from Last 3 Encounters:   08/03/18 118/70   01/08/18 118/64   01/05/17 " 126/72    Weight from Last 3 Encounters:   11/12/18 216 lb (98 kg)   08/03/18 208 lb (94.3 kg)   02/01/18 217 lb (98.4 kg)              Today, you had the following     No orders found for display         Where to get your medicines      These medications were sent to [facility], [place] - [address]  [address] [facility] [place] [place] [address]     Phone:  [phone]     fluticasone furoate 200 MCG/ACT inhaler          Primary Care Provider Office Phone # Fax #    Omid Herminio Dodge [phone]       [address]   [place] [address]        Equal Access to Services     JOANNA ORNELAS : Hadii aad ku hadasho Sofernanda, waaxda luqadaha, qaybta kaalmada adeegyada, shannan gonzalez. So [facility] [phone].    ATENCIÓN: Si habla español, tiene a ramos disposición servicios gratuitos de asistencia lingüística. Llame al [phone].    We comply with applicable federal civil rights laws and [place] laws. We do not discriminate on the basis of race, color, national origin, age, disability, sex, sexual orientation, or gender identity.            Thank you!     Thank you for choosing [facility]  for your care. Our goal is always to provide you with excellent care. Hearing back from our patients is one way we can continue to improve our services. Please take a few minutes to complete the written survey that you may receive in the mail after your visit with us. Thank you!             Your Updated Medication List - Protect others around you: Learn how to safely use, store and throw away your medicines at www.disposemymeds.org.          This list is accurate as of 11/12/18 12:43 PM.  Always use your most recent med list.                   Brand Name Dispense Instructions for use Diagnosis    AEROCHAMBER MAX W/FLOW-VU Misc     1 each    1 Device as needed    Mild persistent asthma without complication       albuterol 108 (90 Base) MCG/ACT inhaler     PROAIR HFA/PROVENTIL HFA/VENTOLIN HFA    1 Inhaler    Inhale 1-2 puffs into the lungs every 6 hours as needed for shortness of breath / dyspnea or wheezing    Mild persistent asthma without complication       cetirizine 10 MG tablet    zyrTEC     Take 10 mg by mouth daily        fluticasone furoate 200 MCG/ACT inhaler    ARNUITY ELLIPTA    3 each    Inhale 1 puff into the lungs daily    Mild persistent asthma without complication

## 2018-11-12 NOTE — PROGRESS NOTES
"  SUBJECTIVE:   Garrison Hastings is a 28 year old male who presents to clinic today for the following health issues:      Asthma Follow-Up    Was ACT completed today?    Yes    ACT Total Scores 11/12/2018   ACT TOTAL SCORE (Goal Greater than or Equal to 20) 19   In the past 12 months, how many times did you visit the emergency room for your asthma without being admitted to the hospital? 0   In the past 12 months, how many times were you hospitalized overnight because of your asthma? 0       Recent asthma triggers that patient is dealing with: upper respiratory infections, animal dander, strong odors and fumes and exercise or sports        Amount of exercise or physical activity: 6-7 days/week for an average of 45-60 minutes    Problems taking medications regularly: No    Medication side effects: none    Diet: regular (no restrictions)            Problem list and histories reviewed & adjusted, as indicated.  Additional history: as documented        Reviewed and updated as needed this visit by clinical staff  Tobacco  Allergies  Meds  Problems  Soc Hx      Reviewed and updated as needed this visit by Provider  Allergies  Meds  Problems         Patient states that his asthma control has been poor over the last few weeks because he has been out of his Arnuity Ellipta inhaler.  He has forgotten to renew it but did get it refilled last week and has been out for the past 4 days.  He states that he can tell a significant difference and he is back to his baseline of good control.  He rarely has to use his albuterol inhaler.    ROS:  10 point ROS of systems including Constitutional, Eyes, HENT, Respiratory, Cardiovascular, Gastroenterology, Genitourinary, Integumentary, Muscularskeletal, Psychiatric were all negative except for pertinent positives noted in my HPI.     OBJECTIVE:   Pulse 88  Temp 98.5  F (36.9  C) (Temporal)  Resp 18  Ht 5' 11\" (1.803 m)  Wt 216 lb (98 kg)  SpO2 98%  BMI 30.13 kg/m2  Body mass " index is 30.13 kg/(m^2).  Physical Exam   Constitutional: He appears well-developed and well-nourished.   Cardiovascular: Normal rate, regular rhythm, S1 normal, S2 normal and normal heart sounds.    No murmur heard.  Pulmonary/Chest: Effort normal and breath sounds normal. No respiratory distress. He has no wheezes. He has no rhonchi. He has no rales.   Neurological: He is alert.         ASSESSMENT/PLAN:       ICD-10-CM    1. Mild persistent asthma without complication J45.30 fluticasone furoate (ARNUITY ELLIPTA) 200 MCG/ACT inhaler     PLAN:  1.  Patient's asthma medications were refilled per usual.  We discussed his asthma control test and I agree that there is nothing further that we need to do as his main symptoms were all related to his lack of being on the controller medication at the time.    Follow up with Provider - Return in about 1 year (around 11/12/2019) for preventative visit (Physical).      Omid Dodge MD   [facility]

## 2018-11-13 ASSESSMENT — ASTHMA QUESTIONNAIRES: ACT_TOTALSCORE: 19

## 2018-12-31 ENCOUNTER — TELEPHONE (OUTPATIENT)
Dept: FAMILY MEDICINE | Facility: CLINIC | Age: 28
End: 2018-12-31

## 2018-12-31 ENCOUNTER — HOSPITAL ENCOUNTER (EMERGENCY)
Facility: CLINIC | Age: 28
Discharge: HOME | End: 2018-12-31
Attending: EMERGENCY MEDICINE | Admitting: EMERGENCY MEDICINE
Payer: COMMERCIAL

## 2018-12-31 ENCOUNTER — APPOINTMENT (OUTPATIENT)
Dept: CT IMAGING | Facility: CLINIC | Age: 28
End: 2018-12-31
Attending: EMERGENCY MEDICINE
Payer: COMMERCIAL

## 2018-12-31 VITALS
BODY MASS INDEX: 27.36 KG/M2 | TEMPERATURE: 98 F | DIASTOLIC BLOOD PRESSURE: 78 MMHG | WEIGHT: 202 LBS | HEIGHT: 72 IN | SYSTOLIC BLOOD PRESSURE: 142 MMHG | RESPIRATION RATE: 16 BRPM | OXYGEN SATURATION: 99 % | HEART RATE: 78 BPM

## 2018-12-31 DIAGNOSIS — R10.32 ABDOMINAL PAIN, LEFT LOWER QUADRANT: ICD-10-CM

## 2018-12-31 LAB
ALBUMIN SERPL-MCNC: 4 G/DL (ref 3.4–5)
ALBUMIN UR-MCNC: NEGATIVE MG/DL
ALP SERPL-CCNC: 82 U/L (ref 40–150)
ALT SERPL W P-5'-P-CCNC: 68 U/L (ref 0–70)
ANION GAP SERPL CALCULATED.3IONS-SCNC: 4 MMOL/L (ref 3–14)
APPEARANCE UR: ABNORMAL
AST SERPL W P-5'-P-CCNC: 33 U/L (ref 0–45)
BASOPHILS # BLD AUTO: 0 10E9/L (ref 0–0.2)
BASOPHILS NFR BLD AUTO: 0.7 %
BILIRUB SERPL-MCNC: 0.7 MG/DL (ref 0.2–1.3)
BILIRUB UR QL STRIP: NEGATIVE
BUN SERPL-MCNC: 12 MG/DL (ref 7–30)
CALCIUM SERPL-MCNC: 8.1 MG/DL (ref 8.5–10.1)
CHLORIDE SERPL-SCNC: 107 MMOL/L (ref 94–109)
CO2 SERPL-SCNC: 28 MMOL/L (ref 20–32)
COLOR UR AUTO: YELLOW
CREAT SERPL-MCNC: 0.94 MG/DL (ref 0.66–1.25)
DIFFERENTIAL METHOD BLD: NORMAL
EOSINOPHIL NFR BLD AUTO: 4.8 %
ERYTHROCYTE [DISTWIDTH] IN BLOOD BY AUTOMATED COUNT: 11.9 % (ref 10–15)
GFR SERPL CREATININE-BSD FRML MDRD: >90 ML/MIN/{1.73_M2}
GLUCOSE SERPL-MCNC: 85 MG/DL (ref 70–99)
GLUCOSE UR STRIP-MCNC: NEGATIVE MG/DL
HCT VFR BLD AUTO: 43.5 % (ref 40–53)
HGB BLD-MCNC: 15.2 G/DL (ref 13.3–17.7)
HGB UR QL STRIP: ABNORMAL
IMM GRANULOCYTES # BLD: 0 10E9/L (ref 0–0.4)
IMM GRANULOCYTES NFR BLD: 0.2 %
KETONES UR STRIP-MCNC: 5 MG/DL
LEUKOCYTE ESTERASE UR QL STRIP: NEGATIVE
LYMPHOCYTES # BLD AUTO: 1.5 10E9/L (ref 0.8–5.3)
LYMPHOCYTES NFR BLD AUTO: 32.4 %
MCH RBC QN AUTO: 28.1 PG (ref 26.5–33)
MCHC RBC AUTO-ENTMCNC: 34.9 G/DL (ref 31.5–36.5)
MCV RBC AUTO: 81 FL (ref 78–100)
MONOCYTES # BLD AUTO: 0.8 10E9/L (ref 0–1.3)
MONOCYTES NFR BLD AUTO: 18.1 %
MUCOUS THREADS #/AREA URNS LPF: PRESENT /LPF
NEUTROPHILS # BLD AUTO: 2 10E9/L (ref 1.6–8.3)
NEUTROPHILS NFR BLD AUTO: 43.8 %
NITRATE UR QL: NEGATIVE
NRBC # BLD AUTO: 0 10*3/UL
NRBC BLD AUTO-RTO: 0 /100
PH UR STRIP: 5 PH (ref 5–7)
PLATELET # BLD AUTO: 236 10E9/L (ref 150–450)
POTASSIUM SERPL-SCNC: 3.7 MMOL/L (ref 3.4–5.3)
PROT SERPL-MCNC: 7.6 G/DL (ref 6.8–8.8)
RBC # BLD AUTO: 5.4 10E12/L (ref 4.4–5.9)
RBC #/AREA URNS AUTO: 5 /HPF (ref 0–2)
SODIUM SERPL-SCNC: 139 MMOL/L (ref 133–144)
SOURCE: ABNORMAL
SP GR UR STRIP: 1.02 (ref 1–1.03)
SQUAMOUS #/AREA URNS AUTO: <1 /HPF (ref 0–1)
UROBILINOGEN UR STRIP-MCNC: 0 MG/DL (ref 0–2)
WBC # BLD AUTO: 4.5 10E9/L (ref 4–11)
WBC #/AREA URNS AUTO: 2 /HPF (ref 0–5)

## 2018-12-31 PROCEDURE — 25000125 ZZHC RX 250: Performed by: RADIOLOGY

## 2018-12-31 PROCEDURE — 25000128 H RX IP 250 OP 636: Performed by: RADIOLOGY

## 2018-12-31 PROCEDURE — 85025 COMPLETE CBC W/AUTO DIFF WBC: CPT | Performed by: EMERGENCY MEDICINE

## 2018-12-31 PROCEDURE — 80053 COMPREHEN METABOLIC PANEL: CPT | Performed by: EMERGENCY MEDICINE

## 2018-12-31 PROCEDURE — 99284 EMERGENCY DEPT VISIT MOD MDM: CPT | Mod: Z6 | Performed by: EMERGENCY MEDICINE

## 2018-12-31 PROCEDURE — 96374 THER/PROPH/DIAG INJ IV PUSH: CPT | Performed by: EMERGENCY MEDICINE

## 2018-12-31 PROCEDURE — 25000132 ZZH RX MED GY IP 250 OP 250 PS 637: Performed by: EMERGENCY MEDICINE

## 2018-12-31 PROCEDURE — 25000128 H RX IP 250 OP 636: Performed by: EMERGENCY MEDICINE

## 2018-12-31 PROCEDURE — 99285 EMERGENCY DEPT VISIT HI MDM: CPT | Mod: 25 | Performed by: EMERGENCY MEDICINE

## 2018-12-31 PROCEDURE — 74177 CT ABD & PELVIS W/CONTRAST: CPT

## 2018-12-31 PROCEDURE — 96361 HYDRATE IV INFUSION ADD-ON: CPT | Mod: 59 | Performed by: EMERGENCY MEDICINE

## 2018-12-31 PROCEDURE — 81001 URINALYSIS AUTO W/SCOPE: CPT | Performed by: EMERGENCY MEDICINE

## 2018-12-31 RX ORDER — KETOROLAC TROMETHAMINE 30 MG/ML
30 INJECTION, SOLUTION INTRAMUSCULAR; INTRAVENOUS ONCE
Status: COMPLETED | OUTPATIENT
Start: 2018-12-31 | End: 2018-12-31

## 2018-12-31 RX ORDER — IOPAMIDOL 755 MG/ML
500 INJECTION, SOLUTION INTRAVASCULAR ONCE
Status: COMPLETED | OUTPATIENT
Start: 2018-12-31 | End: 2018-12-31

## 2018-12-31 RX ORDER — SODIUM CHLORIDE 9 MG/ML
1000 INJECTION, SOLUTION INTRAVENOUS CONTINUOUS
Status: DISCONTINUED | OUTPATIENT
Start: 2018-12-31 | End: 2018-12-31 | Stop reason: HOSPADM

## 2018-12-31 RX ADMIN — SODIUM CHLORIDE 1000 ML: 9 INJECTION, SOLUTION INTRAVENOUS at 11:36

## 2018-12-31 RX ADMIN — SODIUM CHLORIDE 60 ML: 9 INJECTION, SOLUTION INTRAVENOUS at 13:01

## 2018-12-31 RX ADMIN — IOPAMIDOL 100 ML: 755 INJECTION, SOLUTION INTRAVENOUS at 13:00

## 2018-12-31 RX ADMIN — SODIUM CHLORIDE 1000 ML: 9 INJECTION, SOLUTION INTRAVENOUS at 12:22

## 2018-12-31 RX ADMIN — KETOROLAC TROMETHAMINE 30 MG: 30 INJECTION, SOLUTION INTRAMUSCULAR at 11:35

## 2018-12-31 RX ADMIN — MAGNESIUM HYDROXIDE 30 ML: 400 SUSPENSION ORAL at 14:00

## 2018-12-31 ASSESSMENT — MIFFLIN-ST. JEOR: SCORE: 1919.27

## 2018-12-31 NOTE — ED AVS SNAPSHOT
[facility] Emergency Department  [address] [place] [address]  Phone:  [phone]  Fax:  [phone]                                    Garrison Hastings   MRN: [id number]    Department:  [facility] Emergency Department   Date of Visit:  12/31/2018           After Visit Summary Signature Page    I have received my discharge instructions, and my questions have been answered. I have discussed any challenges I see with this plan with the nurse or doctor.    ..........................................................................................................................................  Patient/Patient Representative Signature      ..........................................................................................................................................  Patient Representative Print Name and Relationship to Patient    ..................................................               ................................................  Date                                   Time    ..........................................................................................................................................  Reviewed by Signature/Title    ...................................................              ..............................................  Date                                               Time          22EPIC Rev 08/18

## 2018-12-31 NOTE — TELEPHONE ENCOUNTER
Garrison Hastings is a 28 year old male who calls with Lower left sided abdominal pain.    NURSING ASSESSMENT:  Description:  Lower left sided abd pain.  Described as dull, constant pain  Onset/duration:  5 days  Precip. factors:   Associated symptoms:  Pain increases when lays down.  Started like food poising on Wednesday for approx 1.5 days vomiting. Stools are yellow. Woke up with sweats. Unsure if has fever, but thinks so. Nauseated. Low appetite.  Improves/worsens symptoms:  None.  Pain scale (0-10)   4/10  LMP/preg/breast feeding:     Last exam/Treatment:     Allergies: No Known Allergies      RECOMMENDED DISPOSITION:  See in 24 hours - will go to ED today.  Will comply with recommendation: Yes  If further questions/concerns or if symptoms do not improve, worsen or new symptoms develop, call your PCP or [facility] as soon as possible.      Guideline used: Abd pain adult  Telephone Triage Protocols for Nurses, Fifth. Edition, Tracy Holguin RN

## 2018-12-31 NOTE — ED TRIAGE NOTES
Abdominal pain, nausea, vomiting, diarrhea started wed.  Thursday developed LLQ abd pain that is constant 4/10 pain that does get worse at times.  Also states is having yellowish stools.

## 2018-12-31 NOTE — ED PROVIDER NOTES
History     Chief Complaint   Patient presents with     Abdominal Pain     HPI  Garrison Hastings is a 28 year old male who presents with complaints of 6-day history of left lower quadrant abdominal pain.  Symptoms began after he drank  almond milk.  He questions if he had food poisoning.  No other family members drank almond milk.  Patient states he developed nausea and vomiting.  It was nonbloody.  He then developed multiple episodes of yellow watery diarrhea, again nonbloody.  No fever or chills but this morning woke up drenched in sweat.  He has moderate pain in the left lower quadrant but thinks is related to constipation as he has not had a bowel movement for 2 days.  He denies any flank pain.  No urinary symptoms here.  No personal history of kidney stone or pyelonephritis.  No family history of biliary disease with his father having his gallbladder removed recently.  No personal or family history of Crohn's, ulcerative colitis, or diverticulosis.  He is never had a colonoscopy.  No recent travel.  No antibiotic use.  No injury or lifting.  No rash or history of shingles.  Pain does not radiate down his leg.  Currently denies any upper respiratory symptoms, chest pain or shortness of breath.  No treatment for his pain prior to arrival today.  Problem List:    Patient Active Problem List    Diagnosis Date Noted     Mild persistent asthma without complication 2017     Priority: Medium     Seasonal allergic rhinitis due to pollen 2017     Priority: Medium        Past Medical History:    Past Medical History:   Diagnosis Date     Mild persistent asthma without complication 2017     Seasonal allergic rhinitis due to pollen 2017       Past Surgical History:    History reviewed. No pertinent surgical history.    Family History:    Family History   Problem Relation Age of Onset     Coronary Artery Disease No family hx of      Hyperlipidemia No family hx of      Diabetes No family hx of       Colon Cancer No family hx of      Prostate Cancer No family hx of        Social History:  Marital Status:   [2]  Social History     Tobacco Use     Smoking status: Never Smoker     Smokeless tobacco: Never Used   Substance Use Topics     Alcohol use: Yes     Alcohol/week: 0.0 oz     Comment: 1-2/week     Drug use: No        Medications:      albuterol (PROAIR HFA/PROVENTIL HFA/VENTOLIN HFA) 108 (90 Base) MCG/ACT Inhaler   cetirizine (ZYRTEC) 10 MG tablet   fluticasone furoate (ARNUITY ELLIPTA) 200 MCG/ACT inhaler   Spacer/Aero-Holding Chambers (AEROCHAMBER MAX W/FLOW-VU) MISC         Review of Systems other systems reviewed and are negative    Physical Exam   BP: 130/81  Pulse: 82  Temp: 98  F (36.7  C)  Resp: 16  Height: 182.9 cm (6')  Weight: 91.6 kg (202 lb)  SpO2: 99 %      Physical Exam general alert cooperative male in mild to moderate distress.  HEENT shows no scleral icterus.  No facial swelling.  Oral mucosa is moist and without lesion.  Speech is clear.  Neck is supple.  Lungs were clear without adventitious sounds.  Cardiac auscultation is normal.  On percussion over the back he does not have CVA tenderness.  Abdominal exam reveals mild obesity with active bowel sounds.  On palpation he has tenderness in the left lower quadrant without guarding or rebound.  No organomegaly or masses are noted.  No skin rashes over this area.    ED Course        Procedures               Critical Care time:  none     Results for orders placed or performed during the hospital encounter of 12/31/18   CT Abdomen Pelvis w Contrast    Narrative    CT ABDOMEN PELVIS WITH CONTRAST 12/31/2018 1:10 PM    TECHNIQUE: Images from diaphragm to pubic symphysis 100mL, Isovue 370  IV contrast  Radiation dose for this scan was reduced using automated exposure  control, adjustment of the mA and/or kV according to patient size, or  iterative reconstruction technique.    HISTORY: Abdomen pain, diverticulitis suspected    COMPARISON:  None.    FINDINGS:   Abdomen and Pelvis: Lung bases clear. Normal-appearing liver,  gallbladder, spleen, pancreas, adrenal glands and left kidney.  Nonobstructing 0.2 cm mid right intrarenal renal kidney stone. No  ureteral calculi.    Numerous subcentimeter retroperitoneal nodes and small mesenteric  nodes. No free fluid. No acute appearing bowel abnormality. Normal  appearing appendix is well above the iliac crests adjacent to the  inferior right lobe of the liver.      Impression    IMPRESSION:   1. Tiny nonobstructing right kidney stone.  2. No acute appearing bowel abnormality.                 LYNDA SAUCEDO MD                 Results for orders placed or performed during the hospital encounter of 12/31/18 (from the past 24 hour(s))   CBC with platelets differential   Result Value Ref Range    WBC 4.5 4.0 - 11.0 10e9/L    RBC Count 5.40 4.4 - 5.9 10e12/L    Hemoglobin 15.2 13.3 - 17.7 g/dL    Hematocrit 43.5 40.0 - 53.0 %    MCV 81 78 - 100 fl    MCH 28.1 26.5 - 33.0 pg    MCHC 34.9 31.5 - 36.5 g/dL    RDW 11.9 10.0 - 15.0 %    Platelet Count 236 150 - 450 10e9/L    Diff Method Automated Method     % Neutrophils 43.8 %    % Lymphocytes 32.4 %    % Monocytes 18.1 %    % Eosinophils 4.8 %    % Basophils 0.7 %    % Immature Granulocytes 0.2 %    Nucleated RBCs 0 0 /100    Absolute Neutrophil 2.0 1.6 - 8.3 10e9/L    Absolute Lymphocytes 1.5 0.8 - 5.3 10e9/L    Absolute Monocytes 0.8 0.0 - 1.3 10e9/L    Absolute Basophils 0.0 0.0 - 0.2 10e9/L    Abs Immature Granulocytes 0.0 0 - 0.4 10e9/L    Absolute Nucleated RBC 0.0    Comprehensive metabolic panel   Result Value Ref Range    Sodium 139 133 - 144 mmol/L    Potassium 3.7 3.4 - 5.3 mmol/L    Chloride 107 94 - 109 mmol/L    Carbon Dioxide 28 20 - 32 mmol/L    Anion Gap 4 3 - 14 mmol/L    Glucose 85 70 - 99 mg/dL    Urea Nitrogen 12 7 - 30 mg/dL    Creatinine 0.94 0.66 - 1.25 mg/dL    GFR Estimate >90 >60 mL/min/[1.73_m2]    GFR Estimate If Black >90 >60  mL/min/[1.73_m2]    Calcium 8.1 (L) 8.5 - 10.1 mg/dL    Bilirubin Total 0.7 0.2 - 1.3 mg/dL    Albumin 4.0 3.4 - 5.0 g/dL    Protein Total 7.6 6.8 - 8.8 g/dL    Alkaline Phosphatase 82 40 - 150 U/L    ALT 68 0 - 70 U/L    AST 33 0 - 45 U/L   UA reflex to Microscopic   Result Value Ref Range    Color Urine Yellow     Appearance Urine Slightly Cloudy     Glucose Urine Negative NEG^Negative mg/dL    Bilirubin Urine Negative NEG^Negative    Ketones Urine 5 (A) NEG^Negative mg/dL    Specific Gravity Urine 1.020 1.003 - 1.035    Blood Urine Small (A) NEG^Negative    pH Urine 5.0 5.0 - 7.0 pH    Protein Albumin Urine Negative NEG^Negative mg/dL    Urobilinogen mg/dL 0.0 0.0 - 2.0 mg/dL    Nitrite Urine Negative NEG^Negative    Leukocyte Esterase Urine Negative NEG^Negative    Source Midstream Urine     RBC Urine 5 (H) 0 - 2 /HPF    WBC Urine 2 0 - 5 /HPF    Squamous Epithelial /HPF Urine <1 0 - 1 /HPF    Mucous Urine Present (A) NEG^Negative /LPF   CT Abdomen Pelvis w Contrast    Narrative    CT ABDOMEN PELVIS WITH CONTRAST 12/31/2018 1:10 PM    TECHNIQUE: Images from diaphragm to pubic symphysis 100mL, Isovue 370  IV contrast  Radiation dose for this scan was reduced using automated exposure  control, adjustment of the mA and/or kV according to patient size, or  iterative reconstruction technique.    HISTORY: Abdomen pain, diverticulitis suspected    COMPARISON: None.    FINDINGS:   Abdomen and Pelvis: Lung bases clear. Normal-appearing liver,  gallbladder, spleen, pancreas, adrenal glands and left kidney.  Nonobstructing 0.2 cm mid right intrarenal renal kidney stone. No  ureteral calculi.    Numerous subcentimeter retroperitoneal nodes and small mesenteric  nodes. No free fluid. No acute appearing bowel abnormality. Normal  appearing appendix is well above the iliac crests adjacent to the  inferior right lobe of the liver.      Impression    IMPRESSION:   1. Tiny nonobstructing right kidney stone.  2. No acute appearing  bowel abnormality.                 LYNDA SAUCEDO MD       Medications   0.9% sodium chloride BOLUS (0 mLs Intravenous Stopped 18 1218)     Followed by   sodium chloride 0.9% infusion (0 mLs Intravenous Stopped 18 1307)   magnesium hydroxide (MILK OF MAGNESIA) suspension 30 mL (not administered)   ketorolac (TORADOL) injection 30 mg (30 mg Intravenous Given 18 1135)   iopamidol (ISOVUE-370) solution 500 mL (100 mLs Intravenous Given by Other Clinician 18 1300)   sodium chloride (PF) 0.9% PF flush 3 mL (3 mLs Intravenous Given by Other Clinician 18 1300)   sodium chloride 0.9 % bag 100mL for CT scan flush use (60 mLs Intravenous Given by Other Clinician 18 1301)     IV was established and fluid bolus was provided.  Patient was given Toradol.  Blood and urine are ordered.  At 12:30 PM on recheck patient states his pain is not much better.  On exam he still persistently tender in the left lower quadrant.  Discussed results of his blood work and urinalysis.  Abdominal CT with oral water and IV contrast ordered to assess for diverticulitis or colitis.  At 1:40 PM discussed results of CT showing no acute abnormality in the area of concern.  He does have a small kidney stone on right but no obstructing stone.  Assessments & Plan (with Medical Decision Making)   Garrison Hastings is a 28 year old male who presents with complaints of 6-day history of left lower quadrant abdominal pain.  Symptoms began after he drank  almond milk.  He questions if he had food poisoning.  No other family members drank almond milk.  Patient states he developed nausea and vomiting.  It was nonbloody.  He then developed multiple episodes of yellow watery diarrhea, again nonbloody.  No fever or chills but this morning woke up drenched in sweat.  He has moderate pain in the left lower quadrant but thinks is related to constipation as he has not had a bowel movement for 2 days.  He denies any flank pain.   No urinary symptoms here.  No personal history of kidney stone or pyelonephritis.  No family history of biliary disease with his father having his gallbladder removed recently.  No personal or family history of Crohn's, ulcerative colitis, or diverticulosis.  He is never had a colonoscopy.  No recent travel.  No antibiotic use.  No injury or lifting.  No rash or history of shingles.  Pain does not radiate down his leg.  Currently denies any upper respiratory symptoms, chest pain or shortness of breath.  No treatment for his pain prior to arrival today.  On presentation patient was afebrile and vitally stable.  Is not hypoxic.  He had no scleral icterus.  No CVA tenderness.  He was tender in the left lower quadrant without guarding or rebound.  There is no organomegaly.  No testicular pain or swelling.  Blood work was obtained was unremarkable.  Urine was unremarkable except some ketones and 5 red cells.  No blood.  Patient was given fluids and Toradol.  Despite this he continued to have left lower quadrant pain.  CT with oral water and IV contrast was ordered to assess for colitis versus diverticular disease.  This did not show any worrisome findings in the suspected area.  Did have a tiny kidney stone on the right but no obstructive uropathy.  Exact cause of his symptoms are unclear.  He does have stool in the colon which may be contributing.  Milk of mag was provided.  Information on abdominal pain of unclear etiology is provided.  Reasons to return for reassessment discussed.  I have reviewed the nursing notes.    I have reviewed the findings, diagnosis, plan and need for follow up with the patient.          Medication List      There are no discharge medications for this visit.         Final diagnoses:   Abdominal pain, left lower quadrant       12/31/2018   [facility] EMERGENCY DEPARTMENT     Sandor Pratt MD  12/31/18 [id number]

## 2019-04-04 ENCOUNTER — OFFICE VISIT (OUTPATIENT)
Dept: URGENT CARE | Facility: RETAIL CLINIC | Age: 29
End: 2019-04-04
Payer: COMMERCIAL

## 2019-04-04 VITALS
SYSTOLIC BLOOD PRESSURE: 121 MMHG | OXYGEN SATURATION: 97 % | HEART RATE: 89 BPM | DIASTOLIC BLOOD PRESSURE: 72 MMHG | TEMPERATURE: 98.2 F

## 2019-04-04 DIAGNOSIS — B37.2 YEAST DERMATITIS: Primary | ICD-10-CM

## 2019-04-04 PROCEDURE — 99213 OFFICE O/P EST LOW 20 MIN: CPT | Performed by: FAMILY MEDICINE

## 2019-04-04 NOTE — PROGRESS NOTES
SUBJECTIVE:  Garrison Hastings is a 28 year old male who presents to the clinic today for a rash.  Onset of rash was 4 day(s) ago.   Rash is still present.  Location of the rash: behind ears, bilateral  Quality/symptoms of rash: itching and red   Symptoms are moderate and rash seems to be worsening.  Previous history of a similar rash? No  Recent exposure history: hot tub last weekend, also starting using set of ear sound protection muff he as not used for some time    Associated symptoms include: nothing.    Past Medical History:   Diagnosis Date     Mild persistent asthma without complication 1/5/2017     Seasonal allergic rhinitis due to pollen 1/5/2017     Current Outpatient Medications   Medication Sig Dispense Refill     cetirizine (ZYRTEC) 10 MG tablet Take 10 mg by mouth daily       fluticasone furoate (ARNUITY ELLIPTA) 200 MCG/ACT inhaler Inhale 1 puff into the lungs daily 3 each 3     albuterol (PROAIR HFA/PROVENTIL HFA/VENTOLIN HFA) 108 (90 Base) MCG/ACT Inhaler Inhale 1-2 puffs into the lungs every 6 hours as needed for shortness of breath / dyspnea or wheezing (Patient not taking: Reported on 4/4/2019) 1 Inhaler 1     Spacer/Aero-Holding Chambers (AEROCHAMBER MAX W/FLOW-VU) MISC 1 Device as needed (Patient not taking: Reported on 4/4/2019) 1 each 0     History   Smoking Status     Never Smoker   Smokeless Tobacco     Never Used       ROS:  Review of systems negative except as stated above.    EXAM:   /72   Pulse 89   Temp 98.2  F (36.8  C) (Oral)   SpO2 97%   GENERAL: alert, no acute distress.  SKIN: Rash description:    Slightly red rash in crease behind both ears, dry, no appreciable scale.  Minimal lymph node enlargement below each ear.  ASSESSMENT:  Candidiasis  Vs  eczema    PLAN: Advise alternate antifungal cream with 1%HC cream behind both ears 4 times daily.  Follow up with primary care provider if no improvement.

## 2019-04-11 ENCOUNTER — TELEPHONE (OUTPATIENT)
Dept: FAMILY MEDICINE | Facility: CLINIC | Age: 29
End: 2019-04-11

## 2019-04-11 NOTE — TELEPHONE ENCOUNTER
Panel Management Review      Patient has the following on his problem list:     Asthma review     ACT Total Scores 11/12/2018   ACT TOTAL SCORE (Goal Greater than or Equal to 20) 19   In the past 12 months, how many times did you visit the emergency room for your asthma without being admitted to the hospital? 0   In the past 12 months, how many times were you hospitalized overnight because of your asthma? 0      1. Is Asthma diagnosis on the Problem List? Yes    2. Is Asthma listed on Health Maintenance? Yes    3. Patient is due for:  none      Composite cancer screening  Chart review shows that this patient is due/due soon for the following None  Summary:    Patient is due/failing the following:       Action needed:   UTD     Type of outreach:    None, UTD now.     Questions for provider review:    None                                                                                                                                         Chart routed to  .

## 2019-09-11 DIAGNOSIS — O03.9 MISCARRIAGE: Primary | ICD-10-CM

## 2019-09-30 DIAGNOSIS — O03.9 MISCARRIAGE: ICD-10-CM

## 2019-09-30 PROCEDURE — 36415 COLL VENOUS BLD VENIPUNCTURE: CPT | Performed by: OBSTETRICS & GYNECOLOGY

## 2019-09-30 PROCEDURE — 88264 CHROMOSOME ANALYSIS 20-25: CPT | Performed by: OBSTETRICS & GYNECOLOGY

## 2019-09-30 PROCEDURE — 88230 TISSUE CULTURE LYMPHOCYTE: CPT | Performed by: OBSTETRICS & GYNECOLOGY

## 2019-09-30 PROCEDURE — 88289 CHROMOSOME STUDY ADDITIONAL: CPT | Performed by: OBSTETRICS & GYNECOLOGY

## 2019-10-22 LAB — COPATH REPORT: NORMAL

## 2019-10-23 NOTE — RESULT ENCOUNTER NOTE
Shiloh/Eric Benoit,Please inform Garrison/ or caretaker  that this result(s) is/are normal.  Magnus's chromosome analysis was normal too thanks. ALEX Truong MD

## 2019-11-09 ENCOUNTER — HEALTH MAINTENANCE LETTER (OUTPATIENT)
Age: 29
End: 2019-11-09

## 2019-11-18 ENCOUNTER — IMMUNIZATION (OUTPATIENT)
Dept: FAMILY MEDICINE | Facility: CLINIC | Age: 29
End: 2019-11-18
Payer: COMMERCIAL

## 2019-11-18 DIAGNOSIS — Z23 NEED FOR PROPHYLACTIC VACCINATION AND INOCULATION AGAINST INFLUENZA: Primary | ICD-10-CM

## 2019-11-18 PROCEDURE — 90686 IIV4 VACC NO PRSV 0.5 ML IM: CPT

## 2019-11-18 PROCEDURE — 99207 ZZC NO CHARGE NURSE ONLY: CPT

## 2019-11-18 PROCEDURE — 90471 IMMUNIZATION ADMIN: CPT

## 2019-11-21 ENCOUNTER — OFFICE VISIT (OUTPATIENT)
Dept: FAMILY MEDICINE | Facility: CLINIC | Age: 29
End: 2019-11-21
Payer: COMMERCIAL

## 2019-11-21 VITALS
OXYGEN SATURATION: 99 % | RESPIRATION RATE: 18 BRPM | HEART RATE: 92 BPM | BODY MASS INDEX: 28.17 KG/M2 | SYSTOLIC BLOOD PRESSURE: 104 MMHG | DIASTOLIC BLOOD PRESSURE: 58 MMHG | WEIGHT: 208 LBS | HEIGHT: 72 IN | TEMPERATURE: 98.9 F

## 2019-11-21 DIAGNOSIS — J45.30 MILD PERSISTENT ASTHMA WITHOUT COMPLICATION: Primary | ICD-10-CM

## 2019-11-21 DIAGNOSIS — J30.1 SEASONAL ALLERGIC RHINITIS DUE TO POLLEN: ICD-10-CM

## 2019-11-21 PROCEDURE — 99214 OFFICE O/P EST MOD 30 MIN: CPT | Performed by: FAMILY MEDICINE

## 2019-11-21 RX ORDER — ALBUTEROL SULFATE 90 UG/1
1-2 AEROSOL, METERED RESPIRATORY (INHALATION) EVERY 6 HOURS PRN
Qty: 1 INHALER | Refills: 1 | Status: SHIPPED | OUTPATIENT
Start: 2019-11-21 | End: 2020-12-07

## 2019-11-21 RX ORDER — FLUTICASONE PROPIONATE 50 MCG
2 SPRAY, SUSPENSION (ML) NASAL DAILY
Qty: 16 G | Refills: 12 | Status: SHIPPED | OUTPATIENT
Start: 2019-11-21 | End: 2020-12-07

## 2019-11-21 ASSESSMENT — ASTHMA QUESTIONNAIRES
QUESTION_5 LAST FOUR WEEKS HOW WOULD YOU RATE YOUR ASTHMA CONTROL: COMPLETELY CONTROLLED
ACT_TOTALSCORE: 25
QUESTION_2 LAST FOUR WEEKS HOW OFTEN HAVE YOU HAD SHORTNESS OF BREATH: NOT AT ALL
QUESTION_4 LAST FOUR WEEKS HOW OFTEN HAVE YOU USED YOUR RESCUE INHALER OR NEBULIZER MEDICATION (SUCH AS ALBUTEROL): NOT AT ALL
QUESTION_3 LAST FOUR WEEKS HOW OFTEN DID YOUR ASTHMA SYMPTOMS (WHEEZING, COUGHING, SHORTNESS OF BREATH, CHEST TIGHTNESS OR PAIN) WAKE YOU UP AT NIGHT OR EARLIER THAN USUAL IN THE MORNING: NOT AT ALL
QUESTION_1 LAST FOUR WEEKS HOW MUCH OF THE TIME DID YOUR ASTHMA KEEP YOU FROM GETTING AS MUCH DONE AT WORK, SCHOOL OR AT HOME: NONE OF THE TIME

## 2019-11-21 ASSESSMENT — PAIN SCALES - GENERAL: PAINLEVEL: MILD PAIN (2)

## 2019-11-21 ASSESSMENT — MIFFLIN-ST. JEOR: SCORE: 1941.48

## 2019-11-21 NOTE — LETTER
My Asthma Action Plan    Name: Garrison Hastings   YOB: 1990  Date: 11/21/2019   My doctor: Omid Dodge MD   My clinic: [facility]        My Control Medicine: Fluticasone furoate (Arnuity Ellipta) -  200 mcg daily  My Rescue Medicine: Albuterol (Proair/Ventolin/Proventil HFA) 2-4 puffs EVERY 4 HOURS as needed. Use a spacer if recommended by your provider.   My Asthma Severity:   Moderate Persistent  Know your asthma triggers:   upper respiratory infections  animal dander  strong odors and fumes  exercise or sports            GREEN ZONE   Good Control    I feel good    No cough or wheeze    Can work, sleep and play without asthma symptoms       Take your asthma control medicine every day.     1. If exercise triggers your asthma, take your rescue medication    15 minutes before exercise or sports, and    During exercise if you have asthma symptoms  2. Spacer to use with inhaler: If you have a spacer, make sure to use it with your inhaler             YELLOW ZONE Getting Worse  I have ANY of these:    I do not feel good    Cough or wheeze    Chest feels tight    Wake up at night   1. Keep taking your Green Zone medications  2. Start taking your rescue medicine:    every 20 minutes for up to 1 hour. Then every 4 hours for 24-48 hours.  3. If you stay in the Yellow Zone for more than 12-24 hours, contact your doctor.  4. If you do not return to the Green Zone in 12-24 hours or you get worse, start taking your oral steroid medicine if prescribed by your provider.           RED ZONE Medical Alert - Get Help  I have ANY of these:    I feel awful    Medicine is not helping    Breathing getting harder    Trouble walking or talking    Nose opens wide to breathe       1. Take your rescue medicine NOW  2. If your provider has prescribed an oral steroid medicine, start taking it NOW  3. Call your doctor NOW  4. If you are still in the Red Zone after 20 minutes and you have not reached your  doctor:    Take your rescue medicine again and    Call 911 or go to the emergency room right away    See your regular doctor within 2 weeks of an Emergency Room or Urgent Care visit for follow-up treatment.          Annual Reminders:  Meet with Asthma Educator,  Flu Shot in the Fall, consider Pneumonia Vaccination for patients with asthma (aged 19 and older).    Pharmacy: [facility] [place] [phone] [address]                          Asthma Triggers  How To Control Things That Make Your Asthma Worse    Triggers are things that make your asthma worse.  Look at the list below to help you find your triggers and what you can do about them.  You can help prevent asthma flare-ups by staying away from your triggers.      Trigger                                                          What you can do   Cigarette Smoke  Tobacco smoke can make asthma worse. Do not allow smoking in your home, car or around you.  Be sure no one smokes at a child s day care or school.  If you smoke, ask your health care provider for ways to help you quit.  Ask family members to quit too.  Ask your health care provider for a referral to Quit Plan to help you quit smoking, or call 8-980-881-PLAN.     Colds, Flu, Bronchitis  These are common triggers of asthma. Wash your hands often.  Don t touch your eyes, nose or mouth.  Get a flu shot every year.     Dust Mites  These are tiny bugs that live in cloth or carpet. They are too small to see. Wash sheets and blankets in hot water every week.   Encase pillows and mattress in dust mite proof covers.  Avoid having carpet if you can. If you have carpet, vacuum weekly.   Use a dust mask and HEPA vacuum.   Pollen and Outdoor Mold  Some people are allergic to trees, grass, or weed pollen, or molds. Try to keep your windows closed.  Limit time out doors when pollen count is high.   Ask you health care provider about taking medicine during allergy season.     Animal Dander  Some  people are allergic to skin flakes, urine or saliva from pets with fur or feathers. Keep pets with fur or feathers out of your home.    If you can t keep the pet outdoors, then keep the pet out of your bedroom.  Keep the bedroom door closed.  Keep pets off cloth furniture and away from stuffed toys.     Mice, Rats, and Cockroaches   Some people are allergic to the waste from these pests.   Cover food and garbage.  Clean up spills and food crumbs.  Store grease in the refrigerator.   Keep food out of the bedroom.   Indoor Mold  This can be a trigger if your home has high moisture. Fix leaking faucets, pipes, or other sources of water.   Clean moldy surfaces.  Dehumidify basement if it is damp and smelly.   Smoke, Strong Odors, and Sprays  These can reduce air quality. Stay away from strong odors and sprays, such as perfume, powder, hair spray, paints, smoke incense, paint, cleaning products, candles and new carpet.   Exercise or Sports  Some people with asthma have this trigger. Be active!  Ask your doctor about taking medicine before sports or exercise to prevent symptoms.    Warm up for 5-10 minutes before and after sports or exercise.     Other Triggers of Asthma  Cold air:  Cover your nose and mouth with a scarf.  Sometimes laughing or crying can be a trigger.  Some medicines and food can trigger asthma.

## 2019-11-21 NOTE — PROGRESS NOTES
Subjective     Garrison Hastings is a 29 year old male who presents to clinic today for the following health issues:    HPI   Medication Followup     Taking Medication as prescribed: yes    Side Effects:  None    Medication Helping Symptoms:  yes           Asthma is well controlled on Arnuity Ellipta 200 mcg/day.  No recent flares.      Seasonal allergies have worsened over the past few months.  Uses Cetirizine daily.  sudafed at times.  Has a known deviated septum.  Has seen Dr. Joseph who had recommended surgical correction of his nasal septum.  They did not discuss treatment of his rhinitis much.  He has not tried saline nasal irrigation.  He does not use Flonase regularly.      Reviewed and updated as needed this visit by Provider  Tobacco  Allergies  Meds  Problems  Med Hx  Surg Hx  Fam Hx         Review of Systems   ROS COMP: Constitutional, HEENT, cardiovascular, pulmonary, GI, , musculoskeletal, neuro, skin, endocrine and psych systems are negative, except as otherwise noted.      Objective    /58   Pulse 92   Temp 98.9  F (37.2  C) (Temporal)   Resp 18   Ht 1.829 m (6')   Wt 94.3 kg (208 lb)   SpO2 99%   BMI 28.21 kg/m    Body mass index is 28.21 kg/m .  Physical Exam  Constitutional:       Appearance: He is well-developed.   HENT:      Head: Normocephalic.      Right Ear: Tympanic membrane, ear canal and external ear normal.      Left Ear: Tympanic membrane, ear canal and external ear normal.      Nose: Mucosal edema and rhinorrhea present.      Right Sinus: No maxillary sinus tenderness or frontal sinus tenderness.      Left Sinus: No maxillary sinus tenderness or frontal sinus tenderness.      Mouth/Throat:      Mouth: Mucous membranes are not dry.      Pharynx: Uvula midline. No oropharyngeal exudate.      Tonsils: No tonsillar exudate.   Eyes:      General: Lids are normal.         Right eye: No discharge.         Left eye: No discharge.      Conjunctiva/sclera: Conjunctivae  normal.   Neck:      Musculoskeletal: Normal range of motion and neck supple.      Thyroid: No thyromegaly.   Cardiovascular:      Rate and Rhythm: Normal rate and regular rhythm.      Heart sounds: Normal heart sounds, S1 normal and S2 normal. No murmur.   Pulmonary:      Effort: Pulmonary effort is normal. No respiratory distress.      Breath sounds: Normal breath sounds. No wheezing, rhonchi or rales.   Lymphadenopathy:      Cervical: No cervical adenopathy.   Skin:     General: Skin is warm.      Findings: No erythema or rash.   Neurological:      Mental Status: He is alert.                    Assessment & Plan     ASSESSMENT/ORDERS:    ICD-10-CM    1. Mild persistent asthma without complication J45.30 fluticasone (ARNUITY ELLIPTA) 200 MCG/ACT inhaler     albuterol (PROAIR HFA/PROVENTIL HFA/VENTOLIN HFA) 108 (90 Base) MCG/ACT inhaler   2. Seasonal allergic rhinitis due to pollen J30.1 fluticasone (FLONASE) 50 MCG/ACT nasal spray     PLAN:  1.  See below for over the counter medication recommendations and sinus management instructions.  Asthma stable.  Medications refilled.    Patient Instructions   1.  Saline sinus rinse (one form comes in a squirt bottle form while another kind is known as a Neti Pots).  Follow directions on package.  May do this 1-2 times per day.  2.  Flonase:  A good idea is to use this medication with saline nasal irrigation.  If you choose to do this, use the Flonase about 30-45 minutes after the sinus rinse to maximize effect of medication.    3.  For acute flare-ups:  Sudafed (psudoephedrine) per package directions.  This is the medication that has to be obtained from behind the pharmacist's counter  4.  Antihistamines:  Daytime:  Zyrtec (cetirizine).  Nighttime:  Benadryl (diphenhydramine).  5.  Tylenol (acetaminophen) or Advil (ibuprofen) as needed for pain and/or fever.           BMI:   Estimated body mass index is 28.21 kg/m  as calculated from the following:    Height as of this  encounter: 1.829 m (6').    Weight as of this encounter: 94.3 kg (208 lb).               Return in about 1 year (around 11/21/2020) for medication recheck.    Omid Dodge MD  [facility]

## 2019-11-21 NOTE — PATIENT INSTRUCTIONS
1.  Saline sinus rinse (one form comes in a squirt bottle form while another kind is known as a Neti Pots).  Follow directions on package.  May do this 1-2 times per day.  2.  Flonase:  A good idea is to use this medication with saline nasal irrigation.  If you choose to do this, use the Flonase about 30-45 minutes after the sinus rinse to maximize effect of medication.    3.  For acute flare-ups:  Sudafed (psudoephedrine) per package directions.  This is the medication that has to be obtained from behind the pharmacist's counter  4.  Antihistamines:  Daytime:  Zyrtec (cetirizine).  Nighttime:  Benadryl (diphenhydramine).  5.  Tylenol (acetaminophen) or Advil (ibuprofen) as needed for pain and/or fever.

## 2019-11-22 ASSESSMENT — ASTHMA QUESTIONNAIRES: ACT_TOTALSCORE: 25

## 2020-03-09 ENCOUNTER — MYC REFILL (OUTPATIENT)
Dept: FAMILY MEDICINE | Facility: CLINIC | Age: 30
End: 2020-03-09

## 2020-03-09 DIAGNOSIS — J30.1 SEASONAL ALLERGIC RHINITIS DUE TO POLLEN: ICD-10-CM

## 2020-03-09 DIAGNOSIS — J45.30 MILD PERSISTENT ASTHMA WITHOUT COMPLICATION: ICD-10-CM

## 2020-03-10 RX ORDER — FLUTICASONE PROPIONATE 50 MCG
2 SPRAY, SUSPENSION (ML) NASAL DAILY
Qty: 16 G | Refills: 12 | OUTPATIENT
Start: 2020-03-10

## 2020-03-10 RX ORDER — FLUTICASONE FUROATE 200 UG/1
1 POWDER RESPIRATORY (INHALATION) DAILY
Qty: 3 EACH | Refills: 4 | OUTPATIENT
Start: 2020-03-10

## 2020-03-10 NOTE — TELEPHONE ENCOUNTER
"ELLIPTA  Last Written Prescription Date:  11/21/19  Last Fill Quantity: 3,  # refills: 4   Last office visit: 11/21/2019 with prescribing provider:  Dr. Dodge   Future Office Visit:  NONE  ACT Total Scores 8/3/2018 11/12/2018 11/21/2019   ACT TOTAL SCORE (Goal Greater than or Equal to 20) - 19 25   In the past 12 months, how many times did you visit the emergency room for your asthma without being admitted to the hospital? 0 0 0   In the past 12 months, how many times were you hospitalized overnight because of your asthma? 0 0 0     FLONASE  Last Written Prescription Date:  11/21/19  Last Fill Quantity: 16G,  # refills: 12   Last office visit: 11/21/2019 with prescribing provider:  Dr. Dodge   Future Office Visit: NONE  Requested Prescriptions   Pending Prescriptions Disp Refills     fluticasone (ARNUITY ELLIPTA) 200 MCG/ACT inhaler 3 each 4     Sig: Inhale 1 puff into the lungs daily       Inhaled Steroids Protocol Passed - 3/10/2020  7:40 AM        Passed - Patient is age 12 or older        Passed - Asthma control assessment score within normal limits in last 6 months     Please review ACT score.           Passed - Medication is active on med list        Passed - Recent (6 mo) or future (30 days) visit within the authorizing provider's specialty     Patient had office visit in the last 6 months or has a visit in the next 30 days with authorizing provider or within the authorizing provider's specialty.  See \"Patient Info\" tab in inbasket, or \"Choose Columns\" in Meds & Orders section of the refill encounter.               fluticasone (FLONASE) 50 MCG/ACT nasal spray 16 g 12     Sig: Spray 2 sprays into both nostrils daily       Inhaled Steroids Protocol Passed - 3/10/2020  7:40 AM        Passed - Patient is age 12 or older        Passed - Asthma control assessment score within normal limits in last 6 months     Please review ACT score.           Passed - Medication is active on med list        Passed - Recent " "(6 mo) or future (30 days) visit within the authorizing provider's specialty     Patient had office visit in the last 6 months or has a visit in the next 30 days with authorizing provider or within the authorizing provider's specialty.  See \"Patient Info\" tab in inbasket, or \"Choose Columns\" in Meds & Orders section of the refill encounter.               NOTE : RN called the pharmacy to see if indeed he truly was out of refills and needed more. They pharmacy  Tech looked up and said they have refills left. They did NOT sent a refill request. They just told me to cancel the refills. NOT NEEDED...........................ANTONIO Zelaya                      "

## 2020-03-12 ENCOUNTER — MYC REFILL (OUTPATIENT)
Dept: FAMILY MEDICINE | Facility: CLINIC | Age: 30
End: 2020-03-12

## 2020-03-12 ENCOUNTER — VIRTUAL VISIT (OUTPATIENT)
Dept: FAMILY MEDICINE | Facility: OTHER | Age: 30
End: 2020-03-12

## 2020-03-12 DIAGNOSIS — J45.30 MILD PERSISTENT ASTHMA WITHOUT COMPLICATION: ICD-10-CM

## 2020-03-12 DIAGNOSIS — J30.1 SEASONAL ALLERGIC RHINITIS DUE TO POLLEN: ICD-10-CM

## 2020-03-12 RX ORDER — FLUTICASONE FUROATE 200 UG/1
1 POWDER RESPIRATORY (INHALATION) DAILY
Qty: 3 EACH | Refills: 4 | Status: CANCELLED | OUTPATIENT
Start: 2020-03-12

## 2020-03-12 RX ORDER — FLUTICASONE PROPIONATE 50 MCG
2 SPRAY, SUSPENSION (ML) NASAL DAILY
Qty: 16 G | Refills: 12 | Status: CANCELLED | OUTPATIENT
Start: 2020-03-12

## 2020-03-12 NOTE — TELEPHONE ENCOUNTER
Fluticasone nasal spray  Last Written Prescription Date:  11/21/2019  Last Fill Quantity: 16g,  # refills: 12  Last office visit: 11/21/2019 with prescribing provider:  Dar  Future Office Visit:   Next 5 appointments (look out 90 days)    Mar 26, 2020  7:30 AM CDT  [facility] with Omid Dodge MD  [facility] ([address] [address]  [phone]         georgina kendrick  Last Written Prescription Date:  11/21/2019  Last Fill Quantity: 3,  # refills: 4   Last office visit: 11/21/2019 with prescribing provider:  Dar  Future Office Visit:   Next 5 appointments (look out 90 days)    Mar 26, 2020  7:30 AM CDT  [facility] with Omid Dodge MD  [facility] ([address] [id number] [phone]         Spoke with pharmacy. Patient has refills remaining. Will send message back to patient that pharmacy will fill, he should contact pharmacy for refill.    Luann Allison RN BSN

## 2020-03-13 NOTE — PROGRESS NOTES
"Date: 2020 13:23:04  Clinician: Jason Couch  Clinician NPI: [id number]  Patient: Garrison Hastings  Patient : 1990  Patient Address: [address] [place] [address], [place] [address]  Patient Phone: [phone]  Visit Protocol: URI  Patient Summary:  Garrison is a 29 year old ( : 1990 ) male who initiated a Visit for COVID-19 (Coronavirus) evaluation and screening. When asked the question \"Please sign me up to receive news, health information and promotions from [employer].\", Garrison responded \"Yes\".    Garrison states his symptoms started 1-2 days ago.   His symptoms consist of malaise and a sore throat.   Symptom details   Sore throat: Garrison reports having mild throat pain (1-3 on a 10 point pain scale), does not have exudate on his tonsils, and can swallow liquids. The lymph nodes in his neck are not enlarged. A rash has not appeared on the skin since the sore throat started.    Garrison denies having rhinitis, wheezing, ear pain, cough, nasal congestion, fever, headache, teeth pain, enlarged lymph nodes, chills, facial pain or pressure, and myalgias. He also denies taking antibiotic medication for the symptoms and having recent facial or sinus surgery in the past 60 days. He is not experiencing dyspnea.   Precipitating events  Within the past week, Garrison has not been exposed to someone with strep throat.   Pertinent COVID-19 (Coronavirus) information  Garrison has not traveled internationally in the last 14 days before the start of his symptoms.   Garrison has not had close contact with a laboratory confirmed positive COVID-19 patient within 14 days of symptom onset.   Pertinent medical history  Garrison does not need a return to work/school note.   Weight: 200 lbs   Garrison does not smoke or use smokeless tobacco.   Weight: 200 lbs    MEDICATIONS: fluticasone furoate nasal, fluticasone propionate inhalation, ALLERGIES: NKDA  Clinician Response:  Dear Garrison,  Based on the information " provided, you have a viral upper respiratory infection, otherwise known as a cold. Symptoms vary from person to person, but can include sneezing, coughing, a runny nose, sore throat, and headache and range from mild to severe.  Unfortunately, there are no medications that can cure a cold, so treatment is focused on controlling symptoms as much as possible. Most people gradually feel better until symptoms are gone in 1-2 weeks.  Medication information  Because you have a viral infection, antibiotics will not help you get better. Treating a viral infection with antibiotics could actually make you feel worse.  Unless you are allergic to the over-the-counter medication(s) below, I recommend using:     Ibuprofen (Advil or store brand) 200 mg oral tablet. Take 1-3 tablets (200-600 mg) by mouth every 8 hours to help with the discomfort. Make sure to take the ibuprofen with food. Do not exceed 2400 mg in 24 hours.   Over-the-counter medications do not require a prescription. Ask the pharmacist if you have any questions.  Self care  The following tips will keep you as comfortable as possible while you recover:     Rest    Drink plenty of water and other liquids    Take a hot shower to loosen congestion    Use throat lozenges    Gargle with warm salt water (1/4 teaspoon of salt per 8 ounce glass of water)    Suck on frozen items such as popsicles or ice cubes    Drink hot tea with lemon and honey     When to seek care  Please be seen in a clinic or urgent care if new symptoms develop, or symptoms become worse.  Call 911 or go to the emergency room if you feel that your throat is closing off, you suddenly develop a rash, you are unable to swallow fluids, you are drooling, or you are having difficulty breathing.  COVID-19 (Coronavirus) General Information  We understand it may be concerning to be ill with symptoms that overlap with COVID-19 (Coronavirus) symptoms. Below are some helpful information on COVID-19 (Coronavirus).   How can I protect myself and others from the COVID-19 (Coronavirus)?  Because there is currently no vaccine to prevent infection, the best way to protect yourself is to avoid being exposed to this virus. The CDC recommends the following additional steps:     Wash your hands often with soap and water for at least 20 seconds, especially after blowing your nose, coughing, or sneezing; going to the bathroom; and before eating or preparing food.  Use an alcohol-based hand  that contains at least 60 percent alcohol if soap and water are not available.        Avoid touching your eyes, nose and mouth with unwashed hands.    Avoid close contact with people who are sick.    Stay home when you are sick.    Cover your cough or sneeze with a tissue, then throw the tissue in the trash.    Clean and disinfect frequently touched objects and surfaces.     You can help stop COVID-19 (Coronavirus) by knowing the signs and symptoms:     Fever    Cough    Shortness of breath     Contact your healthcare provider if   Develop symptoms   AND   Have been in close contact with a person known to have COVID-19 (Coronavirus) or live in or have recently traveled from an area with ongoing spread of COVID-19 (Coronavirus). Call ahead before you go to a doctor's office or emergency room. Tell them about your recent travel and your symptoms.   For the most up to date information, visit the CDC's website.  Steps to help prevent the spread of COVID-19 (Coronavirus) if you are sick  If you are sick with COVID-19 (Coronavirus) or suspect you are infected with the virus that causes COVID-19 (Coronavirus), follow the steps below to help prevent the disease from spreading&nbsp;to people in your home and community.     Stay home except to get medical care. Home isolation may be started in consultation with your healthcare clinician.    Separate yourself from other people and animals in your home.    Call ahead before visiting your doctor if you  "have a medical appointment.    Wear a facemask when you are around other people.    Cover your cough and sneezes.    Clean your hands often.    Avoid sharing personal household items.    Clean and disinfect frequently touched objects and surfaces everyday.    You will need to have someone drop off medications or household supplies (if needed) at your house without coming inside or in contact with you or others living in your house.    Monitor your symptoms and seek prompt medical care if your illness is worsening (e.g. Difficulty breathing).    Discontinue home isolation only in consultation with your healthcare provider.     For more detailed and up to date information on what to do if you are sick, visit this link: What to Do If You Are Sick With Coronavirus Disease 2019 (COVID-19).  Do I need to be tested for COVID-19 (Coronavirus)?     At this time, the limited number of tests available are controlled by the state and local health departments and are being reserved for more seriously ill patients, those with known exposure to confirmed patients, and those with recent travel (within 14 days) to countries with high rates of COVID-19 (Coronavirus).    Decisions on which patients receive testing will be based on the local spread of COVID-19 (Coronavirus) as well as the symptoms. Your healthcare provider will make the final decision on whether you should be tested.    In the meantime, if you have concerns that you may have been exposed, it is reasonable to practice \"social distancing.\"&nbsp; If you are ill with a cold or flu like illness, please monitor your symptoms and reach out to your healthcare provider if your symptoms worsen.    For more up to date information, visit this link: COVID-19 (Coronavirus) Frequently Asked Questions and Answers.      Diagnosis: Viral URI  Diagnosis ICD: J06.9  Additional Clinician Notes: Based on your symptoms you do not meet criteria for COVID testing. Your symptoms are suggestive " of a viral URI. You may have strep and could consider being seen in clinic to evaluated for this.  I added general COVID information as an FYI. I apologize for the wait.

## 2020-11-30 ENCOUNTER — APPOINTMENT (OUTPATIENT)
Dept: GENERAL RADIOLOGY | Facility: CLINIC | Age: 30
End: 2020-11-30
Attending: EMERGENCY MEDICINE
Payer: COMMERCIAL

## 2020-11-30 ENCOUNTER — NURSE TRIAGE (OUTPATIENT)
Dept: NURSING | Facility: CLINIC | Age: 30
End: 2020-11-30

## 2020-11-30 ENCOUNTER — HOSPITAL ENCOUNTER (EMERGENCY)
Facility: CLINIC | Age: 30
Discharge: HOME | End: 2020-11-30
Attending: EMERGENCY MEDICINE | Admitting: EMERGENCY MEDICINE
Payer: COMMERCIAL

## 2020-11-30 VITALS
WEIGHT: 208 LBS | TEMPERATURE: 98.4 F | SYSTOLIC BLOOD PRESSURE: 135 MMHG | HEIGHT: 71 IN | DIASTOLIC BLOOD PRESSURE: 95 MMHG | BODY MASS INDEX: 29.12 KG/M2 | HEART RATE: 71 BPM | OXYGEN SATURATION: 97 % | RESPIRATION RATE: 20 BRPM

## 2020-11-30 DIAGNOSIS — R07.9 CHEST PAIN, UNSPECIFIED TYPE: ICD-10-CM

## 2020-11-30 DIAGNOSIS — J06.9 UPPER RESPIRATORY TRACT INFECTION, UNSPECIFIED TYPE: ICD-10-CM

## 2020-11-30 LAB
ANION GAP SERPL CALCULATED.3IONS-SCNC: 6 MMOL/L (ref 3–14)
BASOPHILS # BLD AUTO: 0 10E9/L (ref 0–0.2)
BASOPHILS NFR BLD AUTO: 0.6 %
BUN SERPL-MCNC: 17 MG/DL (ref 7–30)
CALCIUM SERPL-MCNC: 8.8 MG/DL (ref 8.5–10.1)
CHLORIDE SERPL-SCNC: 104 MMOL/L (ref 94–109)
CO2 SERPL-SCNC: 28 MMOL/L (ref 20–32)
CREAT SERPL-MCNC: 0.82 MG/DL (ref 0.66–1.25)
D DIMER PPP FEU-MCNC: 0.4 UG/ML FEU (ref 0–0.5)
DIFFERENTIAL METHOD BLD: ABNORMAL
EOSINOPHIL NFR BLD AUTO: 3.4 %
ERYTHROCYTE [DISTWIDTH] IN BLOOD BY AUTOMATED COUNT: 12.1 % (ref 10–15)
GFR SERPL CREATININE-BSD FRML MDRD: >90 ML/MIN/{1.73_M2}
GLUCOSE SERPL-MCNC: 96 MG/DL (ref 70–99)
HCT VFR BLD AUTO: 43.7 % (ref 40–53)
HGB BLD-MCNC: 15 G/DL (ref 13.3–17.7)
IMM GRANULOCYTES # BLD: 0 10E9/L (ref 0–0.4)
IMM GRANULOCYTES NFR BLD: 0.3 %
LYMPHOCYTES # BLD AUTO: 1.2 10E9/L (ref 0.8–5.3)
LYMPHOCYTES NFR BLD AUTO: 35.5 %
MCH RBC QN AUTO: 28.3 PG (ref 26.5–33)
MCHC RBC AUTO-ENTMCNC: 34.3 G/DL (ref 31.5–36.5)
MCV RBC AUTO: 83 FL (ref 78–100)
MONOCYTES # BLD AUTO: 0.6 10E9/L (ref 0–1.3)
MONOCYTES NFR BLD AUTO: 19 %
NEUTROPHILS # BLD AUTO: 1.4 10E9/L (ref 1.6–8.3)
NEUTROPHILS NFR BLD AUTO: 41.2 %
NRBC # BLD AUTO: 0 10*3/UL
NRBC BLD AUTO-RTO: 0 /100
PLATELET # BLD AUTO: 180 10E9/L (ref 150–450)
POTASSIUM SERPL-SCNC: 3.8 MMOL/L (ref 3.4–5.3)
RBC # BLD AUTO: 5.3 10E12/L (ref 4.4–5.9)
SODIUM SERPL-SCNC: 138 MMOL/L (ref 133–144)
TROPONIN I SERPL-MCNC: <0.015 UG/L (ref 0–0.04)
WBC # BLD AUTO: 3.3 10E9/L (ref 4–11)

## 2020-11-30 PROCEDURE — 93010 ELECTROCARDIOGRAM REPORT: CPT | Performed by: EMERGENCY MEDICINE

## 2020-11-30 PROCEDURE — 85025 COMPLETE CBC W/AUTO DIFF WBC: CPT | Performed by: EMERGENCY MEDICINE

## 2020-11-30 PROCEDURE — 93005 ELECTROCARDIOGRAM TRACING: CPT | Performed by: EMERGENCY MEDICINE

## 2020-11-30 PROCEDURE — 80048 BASIC METABOLIC PNL TOTAL CA: CPT | Performed by: EMERGENCY MEDICINE

## 2020-11-30 PROCEDURE — 36415 COLL VENOUS BLD VENIPUNCTURE: CPT | Performed by: EMERGENCY MEDICINE

## 2020-11-30 PROCEDURE — 99285 EMERGENCY DEPT VISIT HI MDM: CPT | Mod: 25 | Performed by: EMERGENCY MEDICINE

## 2020-11-30 PROCEDURE — 71045 X-RAY EXAM CHEST 1 VIEW: CPT

## 2020-11-30 PROCEDURE — 85379 FIBRIN DEGRADATION QUANT: CPT | Performed by: EMERGENCY MEDICINE

## 2020-11-30 PROCEDURE — 84484 ASSAY OF TROPONIN QUANT: CPT | Performed by: EMERGENCY MEDICINE

## 2020-11-30 ASSESSMENT — MIFFLIN-ST. JEOR: SCORE: 1920.61

## 2020-11-30 NOTE — ED AVS SNAPSHOT
[facility] Emergency Dept  [address] [place] [address]  Phone: [phone]  Fax: [phone]                                    Garrison Hastings   MRN: [id number]    Department: [facility] Emergency Dept   Date of Visit: 11/30/2020           After Visit Summary Signature Page    I have received my discharge instructions, and my questions have been answered. I have discussed any challenges I see with this plan with the nurse or doctor.    ..........................................................................................................................................  Patient/Patient Representative Signature      ..........................................................................................................................................  Patient Representative Print Name and Relationship to Patient    ..................................................               ................................................  Date                                   Time    ..........................................................................................................................................  Reviewed by Signature/Title    ...................................................              ..............................................  Date                                               Time          22EPIC Rev 08/18

## 2020-12-01 NOTE — ED PROVIDER NOTES
History     Chief Complaint   Patient presents with     Chest Pain     Cough     Shortness of Breath     HPI  Garrison Hastings is a 30 year old male who presents with chest discomfort.  It felt like a line of burning down his sternum tonight.  He has had a cough and fever over the last 3 days.  He had Covid testing done at an outside clinic in [place] today.  He has no history of cardiac or pulmonary disease.  No leg swelling.  He did have some numbness down his left arm.    Allergies:  No Known Allergies    Problem List:    Patient Active Problem List    Diagnosis Date Noted     Mild persistent asthma without complication 01/05/2017     Priority: Medium     Seasonal allergic rhinitis due to pollen 01/05/2017     Priority: Medium        Past Medical History:    Past Medical History:   Diagnosis Date     Mild persistent asthma without complication 1/5/2017     Seasonal allergic rhinitis due to pollen 1/5/2017       Past Surgical History:    No past surgical history on file.    Family History:    Family History   Problem Relation Age of Onset     Coronary Artery Disease No family hx of      Hyperlipidemia No family hx of      Diabetes No family hx of      Colon Cancer No family hx of      Prostate Cancer No family hx of        Social History:  Marital Status:   [2]  Social History     Tobacco Use     Smoking status: Never Smoker     Smokeless tobacco: Never Used   Substance Use Topics     Alcohol use: Yes     Alcohol/week: 0.0 standard drinks     Comment: 1-2/week     Drug use: No        Medications:         albuterol (PROAIR HFA/PROVENTIL HFA/VENTOLIN HFA) 108 (90 Base) MCG/ACT inhaler       cetirizine (ZYRTEC) 10 MG tablet       fluticasone (ARNUITY ELLIPTA) 200 MCG/ACT inhaler       fluticasone (FLONASE) 50 MCG/ACT nasal spray       Spacer/Aero-Holding Chambers (AEROCHAMBER MAX W/FLOW-VU) MISC          Review of Systems  All other systems are reviewed and are negative    Physical Exam   BP: (!)  "135/95  Pulse: 71  Temp: 98.4  F (36.9  C)  Resp: 20  Height: 180.3 cm (5' 11\")  Weight: 94.3 kg (208 lb)  SpO2: 99 %      Physical Exam  Vitals signs and nursing note reviewed.   Constitutional:       General: He is not in acute distress.     Appearance: He is well-developed. He is not diaphoretic.   HENT:      Head: Normocephalic and atraumatic.   Eyes:      General: No scleral icterus.        Right eye: No discharge.         Left eye: No discharge.      Conjunctiva/sclera: Conjunctivae normal.   Neck:      Musculoskeletal: Normal range of motion and neck supple.   Cardiovascular:      Rate and Rhythm: Normal rate and regular rhythm.      Heart sounds: Normal heart sounds. No murmur.   Pulmonary:      Effort: Pulmonary effort is normal. No respiratory distress.      Breath sounds: Normal breath sounds. No stridor.   Abdominal:      Palpations: Abdomen is soft.      Tenderness: There is no abdominal tenderness.   Musculoskeletal: Normal range of motion.   Skin:     General: Skin is warm and dry.      Coloration: Skin is not pale.      Findings: No erythema or rash.   Neurological:      Mental Status: He is alert.      Cranial Nerves: No cranial nerve deficit.      Motor: No abnormal muscle tone.         ED Course        Procedures           EKG: Normal sinus rhythm, normal axis.  Rate of 72 beats per minute.  No acute ST or T wave changes.  Interpreted by myself.         Critical Care time:  none               Results for orders placed or performed during the hospital encounter of 11/30/20 (from the past 24 hour(s))   CBC with platelets differential   Result Value Ref Range    WBC 3.3 (L) 4.0 - 11.0 10e9/L    RBC Count 5.30 4.4 - 5.9 10e12/L    Hemoglobin 15.0 13.3 - 17.7 g/dL    Hematocrit 43.7 40.0 - 53.0 %    MCV 83 78 - 100 fl    MCH 28.3 26.5 - 33.0 pg    MCHC 34.3 31.5 - 36.5 g/dL    RDW 12.1 10.0 - 15.0 %    Platelet Count 180 150 - 450 10e9/L    Diff Method Automated Method     % Neutrophils 41.2 %    % " Lymphocytes 35.5 %    % Monocytes 19.0 %    % Eosinophils 3.4 %    % Basophils 0.6 %    % Immature Granulocytes 0.3 %    Nucleated RBCs 0 0 /100    Absolute Neutrophil 1.4 (L) 1.6 - 8.3 10e9/L    Absolute Lymphocytes 1.2 0.8 - 5.3 10e9/L    Absolute Monocytes 0.6 0.0 - 1.3 10e9/L    Absolute Basophils 0.0 0.0 - 0.2 10e9/L    Abs Immature Granulocytes 0.0 0 - 0.4 10e9/L    Absolute Nucleated RBC 0.0    Basic metabolic panel   Result Value Ref Range    Sodium 138 133 - 144 mmol/L    Potassium 3.8 3.4 - 5.3 mmol/L    Chloride 104 94 - 109 mmol/L    Carbon Dioxide 28 20 - 32 mmol/L    Anion Gap 6 3 - 14 mmol/L    Glucose 96 70 - 99 mg/dL    Urea Nitrogen 17 7 - 30 mg/dL    Creatinine 0.82 0.66 - 1.25 mg/dL    GFR Estimate >90 >60 mL/min/[1.73_m2]    GFR Estimate If Black >90 >60 mL/min/[1.73_m2]    Calcium 8.8 8.5 - 10.1 mg/dL   D dimer quantitative   Result Value Ref Range    D Dimer 0.4 0.0 - 0.50 ug/ml FEU   Troponin I   Result Value Ref Range    Troponin I ES <0.015 0.000 - 0.045 ug/L   XR Chest Port 1 View    Narrative    EXAM: XR CHEST PORT 1 VW  LOCATION: [facility]  DATE/TIME: 11/30/2020 10:16 PM    INDICATION: Cough. Chest pain.  COMPARISON: None.      Impression    IMPRESSION: Negative chest.       Medications - No data to display    Assessments & Plan (with Medical Decision Making)  30-year-old male with cough and some chest discomfort.  Work-up as above is benign.  Appears stable for discharge home.  Is instructed to quarantine at home until results of his Covid testing are available from his outside clinic.     I have reviewed the nursing notes.    I have reviewed the findings, diagnosis, plan and need for follow up with the patient.       New Prescriptions    No medications on file       Final diagnoses:   Upper respiratory tract infection, unspecified type   Chest pain, unspecified type       11/30/2020   [facility] EMERGENCY DEPT     Deshawn Vinson MD  11/30/20 [id number]

## 2020-12-01 NOTE — TELEPHONE ENCOUNTER
"Pt called stated he got tested for covid 19 today, and today is day three of his symptoms. Pt reported he has been having major sinus pressure, sore throat, and mild fever for Friday, saturday and Sunday. Pt stated he has shortness of breath and cough.    Pt reported earlier today he has been feeling \"95% really good\". Pt stated for the past 45 minutes I have been having \"chest pain\" on the left upper chest radiating to the left arm shoulder, and the left arm feels \"weird\", and the hand is tingling. Pt described the pain as a pressure pain, and it is heavy. Pt rated his pain level 3/10. Pt sated pain was harder at the beginning, and it was feeling like it was burning but moved to mild pain now. Pt has asthma.      Per johnson pt was adviced to call 911, refused stated the hospital is 10 minutes away and his wife will be taking there. Risk and benefits of calling 911 discussed.      Dwayne Mccann RN  [facility]     COVID 19 Nurse Triage Plan/Patient Instructions    Please be aware that novel coronavirus (COVID-19) may be circulating in the community. If you develop symptoms such as fever, cough, or SOB or if you have concerns about the presence of another infection including coronavirus (COVID-19), please contact your health care provider or visit [email or web address].     Disposition/Instructions    Call to EMS/911 recommended. Follow protocol based instructions.     Bring Your Own Device:  Please also bring your smart device(s) (smart phones, tablets, laptops) and their charging cables for your personal use and to communicate with your care team during your visit.    Thank you for taking steps to prevent the spread of this virus.  o Limit your contact with others.  o Wear a simple mask to cover your cough.  o Wash your hands well and often.    Resources    [facility]: About COVID-19: [email or web address]    CDC: What to Do If You're Sick: " www.cdc.gov/coronavirus/2019-ncov/about/steps-when-sick.html    CDC: Ending Home Isolation: www.cdc.gov/coronavirus/2019-ncov/hcp/disposition-in-home-patients.html     CDC: Caring for Someone: www.cdc.gov/coronavirus/2019-ncov/if-you-are-sick/care-for-someone.html     [facility]: Interim Guidance for Hospital Discharge to Home: www.health[facility]./diseases/coronavirus/hcp/hospdischarge.pdf    [facility] clinical trials (COVID-19 research studies): clinicalaffairs.[facility]-clinical-trials     Below are the COVID-19 hotlines at the [place] Department of Health ([facility]). Interpreters are available.   o For health questions: Call 339-471-1511 or 1-589.670.1790 (7 a.m. to 7 p.m.)  o For questions about schools and childcare: Call 097-717-7222 or 1-461.157.3769 (7 a.m. to 7 p.m.)         Reason for Disposition    [1] Chest pain lasts > 5 minutes AND [2] described as crushing, pressure-like, or heavy    Additional Information    Negative: Severe difficulty breathing (e.g., struggling for each breath, speaks in single words)    Negative: Difficult to awaken or acting confused (e.g., disoriented, slurred speech)    Negative: Shock suspected (e.g., cold/pale/clammy skin, too weak to stand, low BP, rapid pulse)    Negative: [1] Chest pain lasts > 5 minutes AND [2] history of heart disease  (i.e., heart attack, bypass surgery, angina, angioplasty, CHF; not just a heart murmur)    Protocols used: CHEST PAIN-A-AH

## 2020-12-01 NOTE — ED TRIAGE NOTES
Started a few days ago with cough fever, then today has shortness of breath , chest pain, and left arm numbness

## 2020-12-06 ENCOUNTER — HEALTH MAINTENANCE LETTER (OUTPATIENT)
Age: 30
End: 2020-12-06

## 2020-12-07 ENCOUNTER — VIRTUAL VISIT (OUTPATIENT)
Dept: FAMILY MEDICINE | Facility: CLINIC | Age: 30
End: 2020-12-07
Payer: COMMERCIAL

## 2020-12-07 DIAGNOSIS — J45.30 MILD PERSISTENT ASTHMA WITHOUT COMPLICATION: ICD-10-CM

## 2020-12-07 DIAGNOSIS — J30.1 SEASONAL ALLERGIC RHINITIS DUE TO POLLEN: ICD-10-CM

## 2020-12-07 DIAGNOSIS — U07.1 2019 NOVEL CORONAVIRUS DISEASE (COVID-19): Primary | ICD-10-CM

## 2020-12-07 PROCEDURE — 99214 OFFICE O/P EST MOD 30 MIN: CPT | Mod: GT | Performed by: FAMILY MEDICINE

## 2020-12-07 RX ORDER — ALBUTEROL SULFATE 90 UG/1
1-2 AEROSOL, METERED RESPIRATORY (INHALATION) EVERY 6 HOURS PRN
Qty: 1 INHALER | Refills: 1 | Status: SHIPPED | OUTPATIENT
Start: 2020-12-07 | End: 2021-08-27

## 2020-12-07 RX ORDER — FLUTICASONE FUROATE 200 UG/1
1 POWDER RESPIRATORY (INHALATION) DAILY
Qty: 3 EACH | Refills: 4 | Status: SHIPPED | OUTPATIENT
Start: 2020-12-07 | End: 2021-08-27 | Stop reason: DRUGHIGH

## 2020-12-07 RX ORDER — FLUTICASONE PROPIONATE 50 MCG
2 SPRAY, SUSPENSION (ML) NASAL DAILY
Qty: 48 G | Refills: 4 | Status: SHIPPED | OUTPATIENT
Start: 2020-12-07 | End: 2021-08-27

## 2020-12-07 NOTE — PROGRESS NOTES
"Garrison Hastings is a 30 year old male who is being evaluated via a billable video visit.      The patient has been notified of following:     \"This video visit will be conducted via a call between you and your physician/provider. We have found that certain health care needs can be provided without the need for an in-person physical exam.  This service lets us provide the care you need with a video conversation.  If a prescription is necessary we can send it directly to your pharmacy.  If lab work is needed we can place an order for that and you can then stop by our lab to have the test done at a later time.    Video visits are billed at different rates depending on your insurance coverage.  Please reach out to your insurance provider with any questions.    If during the course of the call the physician/provider feels a video visit is not appropriate, you will not be charged for this service.\"    Patient has given verbal consent for Video visit? Yes  How would you like to obtain your AVS? MyChart  If you are dropped from the video visit, the video invite should be resent to: Send to e-mail at:  Will anyone else be joining your video visit? No      Subjective     Garrison Hastings is a 30 year old male who presents today via video visit for the following health issues:    Positive COVID. Still having cough, shortness of breath, fatigue.      HPI           Video Start Time: 2:47 PM      Continues to have some fatigue from COVID-19 infection that started with symptoms 10 days ago.  Tested positive at outside facility.  No fevers for the past few days.      Has asthma and has been taking controller medication as recommended.  Feels this was a good change in medication for him.    Has seasaonal allergies.  Uses Flonase 12 months of year.    Review of Systems   Constitutional, HEENT, cardiovascular, pulmonary, GI, , musculoskeletal, neuro, skin, endocrine and psych systems are negative, except as otherwise noted.    " "  Objective           Vitals:  No vitals were obtained today due to virtual visit.    Physical Exam     GENERAL: Healthy, alert and no distress  EYES: Eyes grossly normal to inspection.  No discharge or erythema, or obvious scleral/conjunctival abnormalities.  RESP: No audible wheeze, cough, or visible cyanosis.  No visible retractions or increased work of breathing.    SKIN: Visible skin clear. No significant rash, abnormal pigmentation or lesions.  NEURO: Cranial nerves grossly intact.  Mentation and speech appropriate for age.  PSYCH: Mentation appears normal, affect normal/bright, judgement and insight intact, normal speech and appearance well-groomed.              Assessment & Plan     ASSESSMENT/ORDERS:    ICD-10-CM    1. 2019 novel coronavirus disease (COVID-19)  U07.1    2. Mild persistent asthma without complication  J45.30 fluticasone (ARNUITY ELLIPTA) 200 MCG/ACT inhaler     albuterol (PROAIR HFA/PROVENTIL HFA/VENTOLIN HFA) 108 (90 Base) MCG/ACT inhaler   3. Seasonal allergic rhinitis due to pollen  J30.1 fluticasone (FLONASE) 50 MCG/ACT nasal spray     PLAN:  1.  Discussed expectations for recovery and that incremental improvement is what he should be looking for.  May return to work whenever he feels physically able to do so.  No longer infectious per CDC guidelines.  2.  Reviewed and refilled asthma medication for stable disease.  Minimal use of albuterol despite COVID-19 infection, so he felt this was good option for him.  3.  Flonase refilled for allergies.     BMI:   Estimated body mass index is 29.01 kg/m  as calculated from the following:    Height as of 11/30/20: 1.803 m (5' 11\").    Weight as of 11/30/20: 94.3 kg (208 lb).                Return in about 6 months (around 6/7/2021) for next preventative visit (Physical).    Omid Dodge MD  [facility]      Video-Visit Details    Type of service:  Video Visit    Video End Time:3:13 PM    Originating Location (pt. " Location): Home    Distant Location (provider location):  [facility]     Platform used for Video Visit: Han

## 2020-12-08 ASSESSMENT — ASTHMA QUESTIONNAIRES: ACT_TOTALSCORE: 21

## 2021-08-03 ENCOUNTER — OFFICE VISIT (OUTPATIENT)
Dept: FAMILY MEDICINE | Facility: CLINIC | Age: 31
End: 2021-08-03
Payer: COMMERCIAL

## 2021-08-03 VITALS
DIASTOLIC BLOOD PRESSURE: 79 MMHG | HEART RATE: 78 BPM | TEMPERATURE: 97.3 F | WEIGHT: 212.1 LBS | BODY MASS INDEX: 29.58 KG/M2 | RESPIRATION RATE: 14 BRPM | OXYGEN SATURATION: 98 % | SYSTOLIC BLOOD PRESSURE: 122 MMHG

## 2021-08-03 DIAGNOSIS — R10.31 GROIN PAIN, RIGHT: ICD-10-CM

## 2021-08-03 DIAGNOSIS — M54.50 ACUTE BILATERAL LOW BACK PAIN WITHOUT SCIATICA: Primary | ICD-10-CM

## 2021-08-03 DIAGNOSIS — N50.811 TESTICULAR PAIN, RIGHT: ICD-10-CM

## 2021-08-03 LAB
ALBUMIN UR-MCNC: NEGATIVE MG/DL
APPEARANCE UR: CLEAR
BILIRUB UR QL STRIP: NEGATIVE
COLOR UR AUTO: YELLOW
GLUCOSE UR STRIP-MCNC: NEGATIVE MG/DL
HGB UR QL STRIP: ABNORMAL
KETONES UR STRIP-MCNC: 5 MG/DL
LEUKOCYTE ESTERASE UR QL STRIP: NEGATIVE
MUCOUS THREADS #/AREA URNS LPF: PRESENT /LPF
NITRATE UR QL: NEGATIVE
PH UR STRIP: 5 [PH] (ref 5–7)
RBC URINE: 1 /HPF
SP GR UR STRIP: 1.02 (ref 1–1.03)
UROBILINOGEN UR STRIP-MCNC: NORMAL MG/DL
WBC URINE: 1 /HPF

## 2021-08-03 PROCEDURE — 81001 URINALYSIS AUTO W/SCOPE: CPT | Performed by: PHYSICIAN ASSISTANT

## 2021-08-03 PROCEDURE — 99215 OFFICE O/P EST HI 40 MIN: CPT | Performed by: PHYSICIAN ASSISTANT

## 2021-08-03 NOTE — PROGRESS NOTES
Assessment & Plan     Acute bilateral low back pain without sciatica  - UA Macro with Reflex to Micro and Culture - lab collect; Future  - UA Macro with Reflex to Micro and Culture - lab collect    Groin pain, right  - UA Macro with Reflex to Micro and Culture - lab collect; Future  - US Testicular & Scrotum w Doppler Ltd; Future  - UA Macro with Reflex to Micro and Culture - lab collect    Testicular pain, right  - US Testicular & Scrotum w Doppler Ltd; Future    UA normal.  Small blood but only 1 red blood cell is noted on microscopy.  Kidney stone is unlikely.  No inguinal hernia is palpable on exam.  Testicular ultrasound scheduled for further evaluation.    40 minutes spent on the date of the encounter doing chart review, patient visit, and documentation     No follow-ups on file.     [facility]IL[facility]   Garrison Hastings is a 31 year old who presents for the following health issues     HPI     Musculoskeletal problem/pain -   Onset/Duration: 5 weeks  Description  Location: BACK( better now), ABDOMEN, HIP (R) AND (R) TESTICLE REGION - right  Joint Swelling: no  Redness: no  Pain: YES- better in back but right groin area and hip 4 wks  Warmth: no  Intensity:  mild, moderate  Progression of Symptoms:  improving  Accompanying signs and symptoms:   Fevers: no  Numbness/tingling/weakness: no  History  Trauma to the area: no  Recent illness:  no  Previous similar problem: no  Previous evaluation:  no  Precipitating or alleviating factors:  Aggravating factors include: sitting, standing, climbing stairs and overuse  Therapies tried and outcome: rest/inactivity, stretching, exercises, Ibuprofen and chiropractor (it has helped so much that the only pain is on the right groin area, Chiropractor suggested - rule out  A hernia, GI, Kidney stone)    Magnus presents to the clinic for evaluation of back pain that seems to have traveled into his groin and testicle.  He  notes symptoms first began on July 2.  There is no specific trigger.  He notes that he was driving his wife's car and just felt that it was really uncomfortable.  After he got out of the car he had pain in his lower back bilaterally and this just seemed to get worse and worse.  He started seeing a chiropractor soon after and notes this has made a big difference.  He has been seeing his chiropractor for the last 3 to 4 weeks.  His last chiropractic appointment was earlier today and he notes that his back pain is almost 0 at this time.  The back pain has been progressively improving after each appointment.  He does feel that the pain has made its way around to the front.  It seems to wrap around his right lower groin and into his right testicle.  He does not remember feeling pain in this area at the onset of his symptoms.  The pain does not seem to be increased with increased abdominal pressure.  Pain is worse when he is sitting for an extended period of time but he does feel pain constantly.  He describes the pain as a pulling or squeezing sensation.  It is minimally relieved with standing but there is nothing he can do to take the pain away.  He states when the significant pain comes it seems to come out of nowhere, and last for approximately 1 hour and then subsides out of nowhere. The pain does not seem to be affected by his diet. There is always a dull pain but does not resolve.  He does note that his right testicle feels larger than his left.  He has had normal bowel movements, usually 1-2 times daily.  He does note that the first time he saw his chiropractor he had a large bowel movement afterwards and it is not uncommon for him to have a large full bowel movement after seeing his chiropractor.  Bowel movements do not seem to have any effect on his pain.  He does take probiotic daily.      Review of Systems   ROS negative except as stated above.      Objective    /79 (BP Location: Left arm)   Pulse 78    Temp 97.3  F (36.3  C) (Temporal)   Resp 14   Wt 96.2 kg (212 lb 1.6 oz)   SpO2 98%   BMI 29.58 kg/m    Physical Exam   GENERAL: healthy, alert and no distress   (male): normal male genitalia without lesions or urethral discharge, no palpable hernia.  Testicles are not significantly tender to palpation, testicles feel grossly equal bilaterally.  MS: no gross musculoskeletal defects noted, no edema.  No pain with palpation over thoracic or lumbar spine or paraspinal muscles.  No CVA tenderness to percussion.  SKIN: no suspicious lesions or rashes    Results for orders placed or performed in visit on 08/03/21   UA Macro with Reflex to Micro and Culture - lab collect     Status: Abnormal    Specimen: Urine, NOS   Result Value Ref Range    Color Urine Yellow Colorless, Straw, Light Yellow, Yellow    Appearance Urine Clear Clear    Glucose Urine Negative Negative mg/dL    Bilirubin Urine Negative Negative    Ketones Urine 5  (A) Negative mg/dL    Specific Gravity Urine 1.025 1.003 - 1.035    Blood Urine Small (A) Negative    pH Urine 5.0 5.0 - 7.0    Protein Albumin Urine Negative Negative mg/dL    Urobilinogen Urine Normal Normal, 2.0 mg/dL    Nitrite Urine Negative Negative    Leukocyte Esterase Urine Negative Negative    Mucus Urine Present (A) None Seen /LPF    RBC Urine 1 <=2 /HPF    WBC Urine 1 <=5 /HPF    Narrative    Urine Culture not indicated

## 2021-08-04 ENCOUNTER — HOSPITAL ENCOUNTER (OUTPATIENT)
Dept: ULTRASOUND IMAGING | Facility: CLINIC | Age: 31
Discharge: HOME | End: 2021-08-04
Attending: PHYSICIAN ASSISTANT | Admitting: PHYSICIAN ASSISTANT
Payer: COMMERCIAL

## 2021-08-04 DIAGNOSIS — N50.811 TESTICULAR PAIN, RIGHT: ICD-10-CM

## 2021-08-04 DIAGNOSIS — R10.31 GROIN PAIN, RIGHT: ICD-10-CM

## 2021-08-04 PROCEDURE — 76870 US EXAM SCROTUM: CPT

## 2021-08-04 ASSESSMENT — ASTHMA QUESTIONNAIRES: ACT_TOTALSCORE: 25

## 2021-08-06 ENCOUNTER — TELEPHONE (OUTPATIENT)
Dept: FAMILY MEDICINE | Facility: CLINIC | Age: 31
End: 2021-08-06

## 2021-08-06 DIAGNOSIS — N50.811 TESTICULAR PAIN, RIGHT: Primary | ICD-10-CM

## 2021-08-06 NOTE — TELEPHONE ENCOUNTER
Spoke to patient and gave him the message below from Cally Warren. Patient will call to get this scheduled. Angie Blakely CMA ([facility])

## 2021-08-06 NOTE — TELEPHONE ENCOUNTER
Testicular ultrasound was normal.     I entered a referral for you to see urology for further evaluation given your persistent symptoms. Call [phone] to schedule an appointment.    Maisha Warren PA-C  [facility]

## 2021-08-27 ENCOUNTER — OFFICE VISIT (OUTPATIENT)
Dept: FAMILY MEDICINE | Facility: CLINIC | Age: 31
End: 2021-08-27
Payer: COMMERCIAL

## 2021-08-27 VITALS
SYSTOLIC BLOOD PRESSURE: 118 MMHG | RESPIRATION RATE: 16 BRPM | BODY MASS INDEX: 29.43 KG/M2 | TEMPERATURE: 97.4 F | HEART RATE: 77 BPM | DIASTOLIC BLOOD PRESSURE: 86 MMHG | OXYGEN SATURATION: 98 % | WEIGHT: 211 LBS

## 2021-08-27 DIAGNOSIS — Z00.00 ROUTINE GENERAL MEDICAL EXAMINATION AT A HEALTH CARE FACILITY: Primary | ICD-10-CM

## 2021-08-27 DIAGNOSIS — J30.1 SEASONAL ALLERGIC RHINITIS DUE TO POLLEN: ICD-10-CM

## 2021-08-27 DIAGNOSIS — J45.30 MILD PERSISTENT ASTHMA WITHOUT COMPLICATION: ICD-10-CM

## 2021-08-27 PROCEDURE — 99395 PREV VISIT EST AGE 18-39: CPT | Performed by: FAMILY MEDICINE

## 2021-08-27 RX ORDER — FLUTICASONE PROPIONATE 50 MCG
2 SPRAY, SUSPENSION (ML) NASAL DAILY
Qty: 48 G | Refills: 4 | Status: SHIPPED | OUTPATIENT
Start: 2021-08-27 | End: 2022-12-08

## 2021-08-27 RX ORDER — ALBUTEROL SULFATE 90 UG/1
1-2 AEROSOL, METERED RESPIRATORY (INHALATION) EVERY 6 HOURS PRN
Qty: 18 G | Refills: 1 | Status: SHIPPED | OUTPATIENT
Start: 2021-08-27 | End: 2022-12-08

## 2021-08-27 ASSESSMENT — ENCOUNTER SYMPTOMS
FEVER: 0
CHILLS: 0
DIZZINESS: 0
SORE THROAT: 0
HEADACHES: 0
NERVOUS/ANXIOUS: 0
MYALGIAS: 0
COUGH: 0
WEAKNESS: 0
ARTHRALGIAS: 0
CONSTIPATION: 0
PALPITATIONS: 0
PARESTHESIAS: 0
NAUSEA: 0
JOINT SWELLING: 0
HEMATOCHEZIA: 0
HEMATURIA: 0
FREQUENCY: 0
HEARTBURN: 0
DYSURIA: 0
EYE PAIN: 0
DIARRHEA: 0
SHORTNESS OF BREATH: 0
ABDOMINAL PAIN: 0

## 2021-08-27 ASSESSMENT — PAIN SCALES - GENERAL: PAINLEVEL: NO PAIN (0)

## 2021-08-27 NOTE — PROGRESS NOTES
SUBJECTIVE:   CC: Garrison Hastings is an 31 year old male who presents for preventative health visit.       Patient has been advised of split billing requirements and indicates understanding: Yes  Healthy Habits:     Getting at least 3 servings of Calcium per day:  Yes    Bi-annual eye exam:  NO    Dental care twice a year:  NO    Sleep apnea or symptoms of sleep apnea:  None    Diet:  Regular (no restrictions)    Frequency of exercise:  1 day/week    Duration of exercise:  15-30 minutes    Taking medications regularly:  Yes    Medication side effects:  None    PHQ-2 Total Score: 0    Additional concerns today:  No          Today's PHQ-2 Score:   PHQ-2 ( 1999 Pf[employer]) 8/27/2021   Q1: Little interest or pleasure in doing things 0   Q2: Feeling down, depressed or hopeless 0   PHQ-2 Score 0   Q1: Little interest or pleasure in doing things Not at all   Q2: Feeling down, depressed or hopeless Not at all   PHQ-2 Score 0       Abuse: Current or Past(Physical, Sexual or Emotional)- No  Do you feel safe in your environment? Yes    Have you ever done Advance Care Planning? (For example, a Health Directive, POLST, or a discussion with a medical provider or your loved ones about your wishes): No, advance care planning information given to patient to review.  Patient declined advance care planning discussion at this time.    Social History     Tobacco Use     Smoking status: Never Smoker     Smokeless tobacco: Never Used   Substance Use Topics     Alcohol use: Yes     Alcohol/week: 0.0 standard drinks     Comment: 1-2/week     If you drink alcohol do you typically have >3 drinks per day or >7 drinks per week? No    Alcohol Use 8/27/2021   Prescreen: >3 drinks/day or >7 drinks/week? No   Prescreen: >3 drinks/day or >7 drinks/week? -   No flowsheet data found.    Reviewed orders with patient. Reviewed health maintenance and updated orders accordingly - Yes      Reviewed and updated as needed this visit by clinical staff  Tobacco   Allergies  Meds   Med Hx  Surg Hx  Fam Hx  Soc Hx        Reviewed and updated as needed this visit by Provider                    Review of Systems   Constitutional: Negative for chills and fever.   HENT: Negative for congestion, ear pain, hearing loss and sore throat.    Eyes: Negative for pain and visual disturbance.   Respiratory: Negative for cough and shortness of breath.    Cardiovascular: Negative for chest pain, palpitations and peripheral edema.   Gastrointestinal: Negative for abdominal pain, constipation, diarrhea, heartburn, hematochezia and nausea.   Genitourinary: Negative for discharge, dysuria, frequency, genital sores, hematuria, impotence and urgency.   Musculoskeletal: Negative for arthralgias, joint swelling and myalgias.   Skin: Negative for rash.   Neurological: Negative for dizziness, weakness, headaches and paresthesias.   Psychiatric/Behavioral: Negative for mood changes. The patient is not nervous/anxious.          OBJECTIVE:   /86   Pulse 77   Temp 97.4  F (36.3  C) (Temporal)   Resp 16   Wt 95.7 kg (211 lb)   SpO2 98%   BMI 29.43 kg/m      Physical Exam  Constitutional:       General: He is not in acute distress.     Appearance: He is well-developed.   HENT:      Head: Normocephalic and atraumatic.      Right Ear: Hearing, tympanic membrane, ear canal and external ear normal.      Left Ear: Hearing, tympanic membrane, ear canal and external ear normal.      Nose: Nose normal.      Mouth/Throat:      Mouth: No oral lesions.      Pharynx: Uvula midline. No oropharyngeal exudate.   Eyes:      General: Lids are normal. No scleral icterus.        Right eye: No discharge.         Left eye: No discharge.      Conjunctiva/sclera: Conjunctivae normal.      Pupils: Pupils are equal, round, and reactive to light.   Neck:      Thyroid: No thyroid mass or thyromegaly.      Trachea: No tracheal deviation.   Cardiovascular:      Rate and Rhythm: Normal rate and regular rhythm.       "Pulses: Normal pulses.      Heart sounds: Normal heart sounds, S1 normal and S2 normal. No murmur heard.   No S3 or S4 sounds.    Pulmonary:      Effort: Pulmonary effort is normal. No respiratory distress.      Breath sounds: Normal breath sounds. No wheezing, rhonchi or rales.   Abdominal:      General: Bowel sounds are normal. There is no distension.      Palpations: Abdomen is soft. There is no mass.      Tenderness: There is no abdominal tenderness. There is no guarding.   Musculoskeletal:         General: No deformity. Normal range of motion.      Cervical back: Normal range of motion and neck supple.   Lymphadenopathy:      Cervical: No cervical adenopathy.      Upper Body:      Right upper body: No supraclavicular adenopathy.      Left upper body: No supraclavicular adenopathy.   Skin:     General: Skin is warm and dry.      Findings: No lesion or rash.   Neurological:      Mental Status: He is alert and oriented to person, place, and time.      Motor: No abnormal muscle tone.      Deep Tendon Reflexes: Reflexes are normal and symmetric.   Psychiatric:         Speech: Speech normal.         Thought Content: Thought content normal.         Judgment: Judgment normal.               ASSESSMENT/PLAN:       ICD-10-CM    1. Routine general medical examination at a health care facility  Z00.00    2. Mild persistent asthma without complication  J45.30 fluticasone (ARNUITY ELLIPTA) 100 MCG/ACT inhaler     albuterol (PROAIR HFA/PROVENTIL HFA/VENTOLIN HFA) 108 (90 Base) MCG/ACT inhaler   3. Seasonal allergic rhinitis due to pollen  J30.1 fluticasone (FLONASE) 50 MCG/ACT nasal spray       Patient has been advised of split billing requirements and indicates understanding: Yes  COUNSELING:   Reviewed preventive health counseling, as reflected in patient instructions    Estimated body mass index is 29.43 kg/m  as calculated from the following:    Height as of 11/30/20: 1.803 m (5' 11\").    Weight as of this encounter: 95.7 " kg (211 lb).     Weight management plan: Discussed healthy diet and exercise guidelines    He reports that he has never smoked. He has never used smokeless tobacco.      Counseling Resources:  ATP IV Guidelines  Pooled Cohorts Equation Calculator  FRAX Risk Assessment  ICSI Preventive Guidelines  Dietary Guidelines for Americans, 2010  USDA's MyPlate  ASA Prophylaxis  Lung CA Screening    Omid Dodge MD  [facility]

## 2021-08-27 NOTE — LETTER
My Asthma Action Plan    Name: Garrison Hastings   YOB: 1990  Date: 11/21/2019   My doctor: Omid Dodge MD   My clinic: [facility]        My Control Medicine: Fluticasone furoate (Arnuity Ellipta) -  100 mcg daily  My Rescue Medicine: Albuterol (Proair/Ventolin/Proventil HFA) 2-4 puffs EVERY 4 HOURS as needed. Use a spacer if recommended by your provider.   My Asthma Severity:   Moderate Persistent  Know your asthma triggers:   upper respiratory infections  animal dander  strong odors and fumes  exercise or sports            GREEN ZONE   Good Control    I feel good    No cough or wheeze    Can work, sleep and play without asthma symptoms       Take your asthma control medicine every day.     1. If exercise triggers your asthma, take your rescue medication    15 minutes before exercise or sports, and    During exercise if you have asthma symptoms  2. Spacer to use with inhaler: If you have a spacer, make sure to use it with your inhaler             YELLOW ZONE Getting Worse  I have ANY of these:    I do not feel good    Cough or wheeze    Chest feels tight    Wake up at night   1. Keep taking your Green Zone medications  2. Start taking your rescue medicine:    every 20 minutes for up to 1 hour. Then every 4 hours for 24-48 hours.  3. If you stay in the Yellow Zone for more than 12-24 hours, contact your doctor.  4. If you do not return to the Green Zone in 12-24 hours or you get worse, start taking your oral steroid medicine if prescribed by your provider.           RED ZONE Medical Alert - Get Help  I have ANY of these:    I feel awful    Medicine is not helping    Breathing getting harder    Trouble walking or talking    Nose opens wide to breathe       1. Take your rescue medicine NOW  2. If your provider has prescribed an oral steroid medicine, start taking it NOW  3. Call your doctor NOW  4. If you are still in the Red Zone after 20 minutes and you have not reached your  doctor:    Take your rescue medicine again and    Call 911 or go to the emergency room right away    See your regular doctor within 2 weeks of an Emergency Room or Urgent Care visit for follow-up treatment.          Annual Reminders:  Meet with Asthma Educator,  Flu Shot in the Fall, consider Pneumonia Vaccination for patients with asthma (aged 19 and older).    Pharmacy: [facility] [place] [id number] [address]                          Asthma Triggers  How To Control Things That Make Your Asthma Worse    Triggers are things that make your asthma worse.  Look at the list below to help you find your triggers and what you can do about them.  You can help prevent asthma flare-ups by staying away from your triggers.      Trigger                                                          What you can do   Cigarette Smoke  Tobacco smoke can make asthma worse. Do not allow smoking in your home, car or around you.  Be sure no one smokes at a child s day care or school.  If you smoke, ask your health care provider for ways to help you quit.  Ask family members to quit too.  Ask your health care provider for a referral to Quit Plan to help you quit smoking, or call 7-147-489-PLAN.     Colds, Flu, Bronchitis  These are common triggers of asthma. Wash your hands often.  Don t touch your eyes, nose or mouth.  Get a flu shot every year.     Dust Mites  These are tiny bugs that live in cloth or carpet. They are too small to see. Wash sheets and blankets in hot water every week.   Encase pillows and mattress in dust mite proof covers.  Avoid having carpet if you can. If you have carpet, vacuum weekly.   Use a dust mask and HEPA vacuum.   Pollen and Outdoor Mold  Some people are allergic to trees, grass, or weed pollen, or molds. Try to keep your windows closed.  Limit time out doors when pollen count is high.   Ask you health care provider about taking medicine during allergy season.     Animal Dander  Some  people are allergic to skin flakes, urine or saliva from pets with fur or feathers. Keep pets with fur or feathers out of your home.    If you can t keep the pet outdoors, then keep the pet out of your bedroom.  Keep the bedroom door closed.  Keep pets off cloth furniture and away from stuffed toys.     Mice, Rats, and Cockroaches   Some people are allergic to the waste from these pests.   Cover food and garbage.  Clean up spills and food crumbs.  Store grease in the refrigerator.   Keep food out of the bedroom.   Indoor Mold  This can be a trigger if your home has high moisture. Fix leaking faucets, pipes, or other sources of water.   Clean moldy surfaces.  Dehumidify basement if it is damp and smelly.   Smoke, Strong Odors, and Sprays  These can reduce air quality. Stay away from strong odors and sprays, such as perfume, powder, hair spray, paints, smoke incense, paint, cleaning products, candles and new carpet.   Exercise or Sports  Some people with asthma have this trigger. Be active!  Ask your doctor about taking medicine before sports or exercise to prevent symptoms.    Warm up for 5-10 minutes before and after sports or exercise.     Other Triggers of Asthma  Cold air:  Cover your nose and mouth with a scarf.  Sometimes laughing or crying can be a trigger.  Some medicines and food can trigger asthma.

## 2021-09-26 ENCOUNTER — HEALTH MAINTENANCE LETTER (OUTPATIENT)
Age: 31
End: 2021-09-26

## 2022-08-04 ENCOUNTER — OFFICE VISIT (OUTPATIENT)
Dept: FAMILY MEDICINE | Facility: CLINIC | Age: 32
End: 2022-08-04
Payer: COMMERCIAL

## 2022-08-04 VITALS
DIASTOLIC BLOOD PRESSURE: 86 MMHG | RESPIRATION RATE: 16 BRPM | OXYGEN SATURATION: 97 % | BODY MASS INDEX: 29.81 KG/M2 | WEIGHT: 212.9 LBS | HEART RATE: 78 BPM | HEIGHT: 71 IN | SYSTOLIC BLOOD PRESSURE: 124 MMHG | TEMPERATURE: 97.8 F

## 2022-08-04 DIAGNOSIS — M54.41 CHRONIC RIGHT-SIDED LOW BACK PAIN WITH RIGHT-SIDED SCIATICA: Primary | ICD-10-CM

## 2022-08-04 DIAGNOSIS — G89.29 CHRONIC RIGHT-SIDED LOW BACK PAIN WITH RIGHT-SIDED SCIATICA: Primary | ICD-10-CM

## 2022-08-04 PROCEDURE — 99213 OFFICE O/P EST LOW 20 MIN: CPT | Performed by: STUDENT IN AN ORGANIZED HEALTH CARE EDUCATION/TRAINING PROGRAM

## 2022-08-04 RX ORDER — NAPROXEN 500 MG/1
500 TABLET ORAL 2 TIMES DAILY WITH MEALS
Qty: 60 TABLET | Refills: 0 | Status: SHIPPED | OUTPATIENT
Start: 2022-08-04 | End: 2022-12-08

## 2022-08-04 RX ORDER — CYCLOBENZAPRINE HCL 10 MG
10 TABLET ORAL 3 TIMES DAILY PRN
Qty: 30 TABLET | Refills: 1 | Status: SHIPPED | OUTPATIENT
Start: 2022-08-04 | End: 2022-12-08

## 2022-08-04 ASSESSMENT — ASTHMA QUESTIONNAIRES: ACT_TOTALSCORE: 25

## 2022-08-04 NOTE — PROGRESS NOTES
"  Assessment & Plan     Chronic right-sided low back pain with right-sided sciatica  Patient with chronic back pain that has not been resolving.  Does have sciatic symptoms with SI tenderness.  Has not seen physical therapy in the past and will have him follow-up with him now.  We will try naproxen instead of ibuprofen and have him use Flexeril at night to see if this calms things down.  We discussed proper ergonomics at home with footwear and avoiding billfold in back pocket and belt use.  If things not improving with therapy I would follow-up with MRI.  Also consider SI injection in the future.  - cyclobenzaprine (FLEXERIL) 10 MG tablet  Dispense: 30 tablet; Refill: 1  - naproxen (NAPROSYN) 500 MG tablet  Dispense: 60 tablet; Refill: 0  - Physical Therapy Referral           BMI:   Estimated body mass index is 29.69 kg/m  as calculated from the following:    Height as of this encounter: 1.803 m (5' 11\").    Weight as of this encounter: 96.6 kg (212 lb 14.4 oz).   Weight management plan: Discussed healthy diet and exercise guidelines      César Lujan MD  [facility] KASSIE Agudelo is a 32 year old, presenting for the following health issues:  Back Pain (Follow up)      History of Present Illness       Back Pain:  He presents for follow up of back pain. Patient's back pain is a chronic problem.  Location of back pain:  Right lower back  Description of back pain: shooting  Back pain spreads: right buttocks, right thigh, right knee and right foot    Since patient first noticed back pain, pain is: gradually worsening  Does back pain interfere with his job:  Yes      He eats 2-3 servings of fruits and vegetables daily.He consumes 1 sweetened beverage(s) daily.He exercises with enough effort to increase his heart rate 30 to 60 minutes per day.  He exercises with enough effort to increase his heart rate 4 days per week.   He is taking medications regularly.     Patient does have " "symptoms going on for over a year.  Continues to go down the right leg and bothers him worse at night when he is trying to sleep.  No major injuries that he knows.  No previous back pain.  Team on insidiously.  Does use lots of ibuprofen which is helpful.    Review of Systems   Constitutional, HEENT, cardiovascular, pulmonary, gi and gu systems are negative, except as otherwise noted.      Objective    /86 (BP Location: Left arm, Patient Position: Sitting, Cuff Size: Adult Large)   Pulse 78   Temp 97.8  F (36.6  C) (Temporal)   Resp 16   Ht 1.803 m (5' 11\")   Wt 96.6 kg (212 lb 14.4 oz)   SpO2 97%   BMI 29.69 kg/m    Body mass index is 29.69 kg/m .  Physical Exam   GENERAL: healthy, alert and no distress  EYES: Eyes grossly normal to inspection, PERRL and conjunctivae and sclerae normal  HENT: Hearing grossly intact  RESP: Breathing comfortably room air  CV: No lower extremity edema  MS: no gross musculoskeletal defects noted, no edema, right-sided paraspinal lumbar tenderness to palpation without spinal process tenderness, does have SI joint tenderness with pain shooting down his right leg.  Positive straight leg on the right.  Range of motion of the hip intact.  No greater trochanteric or IT band tenderness to palpation  SKIN: no suspicious lesions or rashes  NEURO: Normal strength and tone, mentation intact and speech normal  PSYCH: mentation appears normal, affect normal/bright          .  ..  "

## 2022-08-15 ENCOUNTER — HOSPITAL ENCOUNTER (OUTPATIENT)
Dept: PHYSICAL THERAPY | Facility: CLINIC | Age: 32
Setting detail: THERAPIES SERIES
Discharge: HOME | End: 2022-08-15
Attending: STUDENT IN AN ORGANIZED HEALTH CARE EDUCATION/TRAINING PROGRAM
Payer: COMMERCIAL

## 2022-08-15 DIAGNOSIS — G89.29 CHRONIC RIGHT-SIDED LOW BACK PAIN WITH RIGHT-SIDED SCIATICA: ICD-10-CM

## 2022-08-15 DIAGNOSIS — M54.41 CHRONIC RIGHT-SIDED LOW BACK PAIN WITH RIGHT-SIDED SCIATICA: ICD-10-CM

## 2022-08-15 PROCEDURE — 97161 PT EVAL LOW COMPLEX 20 MIN: CPT | Mod: GP | Performed by: PHYSICAL THERAPIST

## 2022-08-15 PROCEDURE — 97110 THERAPEUTIC EXERCISES: CPT | Mod: GP | Performed by: PHYSICAL THERAPIST

## 2022-08-15 PROCEDURE — 97112 NEUROMUSCULAR REEDUCATION: CPT | Mod: GP | Performed by: PHYSICAL THERAPIST

## 2022-08-15 NOTE — PROGRESS NOTES
08/15/22 1418   General Information   Type of Visit Initial OP Ortho PT Evaluation   Start of Care Date 08/15/22   Referring Physician Dr. César Lujan   Patient/Family Goals Statement Get back to no pain.   Orders Evaluate and Treat   Date of Order 08/04/22   Certification Required? No   Medical Diagnosis Chronic right-sided low back pain with right-sided sciatica (M54.41, G89.29)   Surgical/Medical history reviewed Yes   Precautions/Limitations no known precautions/limitations   Weight-Bearing Status - LLE full weight-bearing   Weight-Bearing Status - RLE full weight-bearing   General Information Comments PMH: Osgood Schlatters B, 4 significant ankle sprains on R side in high school.       Present No   Body Part(s)   Body Part(s) Lumbar Spine/SI   Presentation and Etiology   Pertinent history of current problem (include personal factors and/or comorbidities that impact the POC) About a year ago noticing back pain.  Started with chiropractic care, did help, and back this past spring came back, and adjustments were not helping.  And now starting to go down to the R foot.  3 months ago started to go down the leg.  At first started R side of back and came around the front to the groin/testicle region.  Sleeping/wrong position will increase symptom intensity.  Ibuprofen wasn't touching the pain.  SKTC, DKTC, piriformis stretch, and trunk stretching.   Impairments A. Pain;K. Numbness;L. Tingling   Functional Limitations perform activities of daily living;perform required work activities   Symptom Location R side low back radiating down to R heel/toes   How/Where did it occur From insidious onset   Onset date of current episode/exacerbation 08/15/21  (About a year ago)   Chronicity Chronic   Pain rating (0-10 point scale) Best (/10);Worst (/10)   Best (/10) 0/10   Worst (/10) 6/10   Pain quality B. Dull   Frequency of pain/symptoms A. Constant   Pain/symptoms are: Worse during the night    Pain/symptoms exacerbated by A. Sitting;C. Lifting;D. Carrying;G. Certain positions;J. ADL;K. Home tasks;L. Work tasks  (Driving, sleeping)   Pain/symptoms eased by   (Muscle relaxor, neproxan, hot showers)   Progression of symptoms since onset: Worsened  (Last 3 months started with LE symptoms)   Prior Level of Function   Prior Level of Function-Mobility IND   Prior Level of Function-ADLs IND   Current Level of Function   Current Community Support Family/friend caregiver   Patient role/employment history A. 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   [facility]   Current equipment-Gait/Locomotion None   Current equipment-ADL None   Fall Risk Screen   Fall screen completed by PT   Have you fallen 2 or more times in the past year? No   Have you fallen and had an injury in the past year? No   Is patient a fall risk? No   Fall screen comments Icy getting out of truck, however landed on his R side.   Abuse Screen (yes response referral indicated)   Feels Unsafe at Home or Work/School no   Feels Threatened by Someone no   Does Anyone Try to Keep You From Having Contact with Others or Doing Things Outside Your Home? no   Physical Signs of Abuse Present no   Patient needs abuse support services and resources No   System Outcome Measures   Outcome Measures Low Back Pain (see Oswestry and Nicholas)   Lumbar Spine/SI Objective Findings   Neurological Testing Comments Normal   Gait/Locomotion Antalgic gait, however more supination of the R foot, which can cause asymmetrical gait.   Balance/Proprioception (Single Leg Stance) More instability on the R side at the ankle with SLS.   Hamstring Flexibility Increased tone R > L   Hip Flexor Flexibility Increased tone R > L   Piriformis Flexibility Increased tone R > L   Flexion ROM WNL, symptoms centralize to spine   Extension ROM WNL, symptoms increase down leg   Right Side Bending  ROM WNL, symptoms centralize to spine   Left Side Bending ROM WNL, symptoms centralize to spine   Pelvic Screen Symmetrical, slight decrease in R SI mobility   Hip Screen WNL   Transversus Abdominus Strength ([place] Leg Lowering-deg) Able to activate   Hip Flexion (L2) Strength 4+5   Hip Abduction Strength 4+/5   Hip Extension Strength 4+/5   Knee Flexion Strength 5/5   Knee Extension (L3) Strength 5/5   Ankle Dorsiflexion (L4) Strength 5/5   Great Toe Extension (L5) Strength 5/5   Ankle Plantar Flexion (S1) Strength 5/5   SLR Negative   Hiren Test Negative   Prone Instability Test Negative   Crossover SLR Negative   Spring Test Negative   Segmental Mobility Decreased SI mobility on R side.   Palpation Tenderness along R > L SI joint lines.   Slump Test Positive R   Observation No acute distress   Posture Forward head, rounded shoulders   Sensation Testing Normal   Planned Therapy Interventions   Planned Therapy Interventions balance training;joint mobilization;manual therapy;neuromuscular re-education;ROM;strengthening;stretching   Clinical Impression   Criteria for Skilled Therapeutic Interventions Met yes, treatment indicated   PT Diagnosis Weakness of hips, somatic dysfunctions of SI joint line, poor posture, instability of R ankle, poor foot position on R side.   Influenced by the following impairments Radicular pain   Functional limitations due to impairments Sleeping, driving, sitting, carrying, lifting   Clinical Presentation Evolving/Changing   Clinical Presentation Rationale Pt is a 32 year old male with complaints of R low back and radicular symptoms down the R LE.  Pt demonstrates with poor posture, hip weakness, somatic dysfunctions of the SI joints, poor ankle stability, and compensatory patterns with daily activities.  Pt reports challenges with sleeping, sitting, driving, and work duties due to increased symptoms.  Pt will benefit from skilled PT services to address impairments and return pt to  [facility].   Clinical Decision Making (Complexity) Low complexity   Therapy Frequency 1 time/week   Predicted Duration of Therapy Intervention (days/wks) 6 weeks   Risk & Benefits of therapy have been explained Yes   Patient, Family & other staff in agreement with plan of care Yes   Education Assessment   Preferred Learning Style Listening;Demonstration;Pictures/video   Barriers to Learning No barriers   ORTHO GOALS   PT Ortho Eval Goals 1;2;3   Ortho Goal 1   Goal Identifier #1   Goal Description Pt will report no R LE symptoms with driving or sleeping in order to demonstrate improvement in symptoms with daily activities.   Target Date 09/28/22   Ortho Goal 2   Goal Identifier #2   Goal Description Pt will demonstrate ability to go a full work day with <2/10 back symptoms and no leg symptoms in order to be able to work without distraction of pain.   Target Date 09/28/22   Ortho Goal 3   Goal Identifier #3   Goal Description Pt will demonstrate ability to return to PLOF without back or R LE symptoms while performing lifting and carrying tasks at work.   Target Date 09/28/22   Total Evaluation Time   PT Eval, Low Complexity Minutes (71876) 30     Thank you for your referral.    Lara Jackson, PT, DPT  [facility]  [phone]

## 2022-08-26 ENCOUNTER — HOSPITAL ENCOUNTER (OUTPATIENT)
Dept: PHYSICAL THERAPY | Facility: CLINIC | Age: 32
Setting detail: THERAPIES SERIES
Discharge: HOME | End: 2022-08-26
Attending: STUDENT IN AN ORGANIZED HEALTH CARE EDUCATION/TRAINING PROGRAM
Payer: COMMERCIAL

## 2022-08-26 PROCEDURE — 97112 NEUROMUSCULAR REEDUCATION: CPT | Mod: GP | Performed by: PHYSICAL THERAPIST

## 2022-08-26 PROCEDURE — 97110 THERAPEUTIC EXERCISES: CPT | Mod: GP | Performed by: PHYSICAL THERAPIST

## 2022-09-06 ENCOUNTER — HOSPITAL ENCOUNTER (OUTPATIENT)
Dept: PHYSICAL THERAPY | Facility: CLINIC | Age: 32
Setting detail: THERAPIES SERIES
Discharge: HOME | End: 2022-09-06
Attending: STUDENT IN AN ORGANIZED HEALTH CARE EDUCATION/TRAINING PROGRAM
Payer: COMMERCIAL

## 2022-09-06 PROCEDURE — 97110 THERAPEUTIC EXERCISES: CPT | Mod: GP | Performed by: PHYSICAL THERAPIST

## 2022-09-06 PROCEDURE — 97140 MANUAL THERAPY 1/> REGIONS: CPT | Mod: GP | Performed by: PHYSICAL THERAPIST

## 2022-09-07 ENCOUNTER — HOSPITAL ENCOUNTER (OUTPATIENT)
Dept: PHYSICAL THERAPY | Facility: CLINIC | Age: 32
Setting detail: THERAPIES SERIES
Discharge: HOME | End: 2022-09-07
Attending: STUDENT IN AN ORGANIZED HEALTH CARE EDUCATION/TRAINING PROGRAM
Payer: COMMERCIAL

## 2022-09-07 PROCEDURE — 97140 MANUAL THERAPY 1/> REGIONS: CPT | Mod: GP | Performed by: PHYSICAL THERAPIST

## 2022-09-15 ENCOUNTER — HOSPITAL ENCOUNTER (OUTPATIENT)
Dept: PHYSICAL THERAPY | Facility: CLINIC | Age: 32
Setting detail: THERAPIES SERIES
Discharge: HOME | End: 2022-09-15
Attending: STUDENT IN AN ORGANIZED HEALTH CARE EDUCATION/TRAINING PROGRAM
Payer: COMMERCIAL

## 2022-09-15 PROCEDURE — 97110 THERAPEUTIC EXERCISES: CPT | Mod: GP | Performed by: PHYSICAL THERAPIST

## 2022-10-18 ENCOUNTER — MYC MEDICAL ADVICE (OUTPATIENT)
Dept: FAMILY MEDICINE | Facility: CLINIC | Age: 32
End: 2022-10-18

## 2022-10-18 DIAGNOSIS — J45.30 MILD PERSISTENT ASTHMA WITHOUT COMPLICATION: ICD-10-CM

## 2022-10-22 NOTE — TELEPHONE ENCOUNTER
Prescription approved per [facility] Refill Protocol.    Brenda Murillo, BSN, RN

## 2022-12-05 ASSESSMENT — ENCOUNTER SYMPTOMS
JOINT SWELLING: 0
DYSURIA: 0
CONSTIPATION: 0
DIARRHEA: 0
MYALGIAS: 0
HEMATURIA: 0
DIZZINESS: 0
SHORTNESS OF BREATH: 0
HEARTBURN: 0
SORE THROAT: 0
COUGH: 0
EYE PAIN: 0
ARTHRALGIAS: 0
HEADACHES: 0
PARESTHESIAS: 0
PALPITATIONS: 0
ABDOMINAL PAIN: 0
CHILLS: 0
NERVOUS/ANXIOUS: 0
FEVER: 0
NAUSEA: 0
WEAKNESS: 0
HEMATOCHEZIA: 0
FREQUENCY: 0

## 2022-12-08 ENCOUNTER — OFFICE VISIT (OUTPATIENT)
Dept: FAMILY MEDICINE | Facility: CLINIC | Age: 32
End: 2022-12-08
Payer: COMMERCIAL

## 2022-12-08 VITALS
WEIGHT: 214.13 LBS | DIASTOLIC BLOOD PRESSURE: 64 MMHG | HEIGHT: 73 IN | HEART RATE: 92 BPM | TEMPERATURE: 98.8 F | OXYGEN SATURATION: 98 % | BODY MASS INDEX: 28.38 KG/M2 | SYSTOLIC BLOOD PRESSURE: 112 MMHG

## 2022-12-08 DIAGNOSIS — J30.1 SEASONAL ALLERGIC RHINITIS DUE TO POLLEN: ICD-10-CM

## 2022-12-08 DIAGNOSIS — Z23 NEED FOR PROPHYLACTIC VACCINATION AND INOCULATION AGAINST INFLUENZA: ICD-10-CM

## 2022-12-08 DIAGNOSIS — Z00.00 ROUTINE GENERAL MEDICAL EXAMINATION AT A HEALTH CARE FACILITY: Primary | ICD-10-CM

## 2022-12-08 DIAGNOSIS — J45.30 MILD PERSISTENT ASTHMA WITHOUT COMPLICATION: ICD-10-CM

## 2022-12-08 PROCEDURE — 99395 PREV VISIT EST AGE 18-39: CPT | Mod: 25 | Performed by: FAMILY MEDICINE

## 2022-12-08 PROCEDURE — 90471 IMMUNIZATION ADMIN: CPT | Performed by: FAMILY MEDICINE

## 2022-12-08 PROCEDURE — 90472 IMMUNIZATION ADMIN EACH ADD: CPT | Performed by: FAMILY MEDICINE

## 2022-12-08 PROCEDURE — 90715 TDAP VACCINE 7 YRS/> IM: CPT | Performed by: FAMILY MEDICINE

## 2022-12-08 PROCEDURE — 90686 IIV4 VACC NO PRSV 0.5 ML IM: CPT | Performed by: FAMILY MEDICINE

## 2022-12-08 RX ORDER — FLUTICASONE PROPIONATE 50 MCG
2 SPRAY, SUSPENSION (ML) NASAL DAILY
Qty: 48 G | Refills: 4 | Status: SHIPPED | OUTPATIENT
Start: 2022-12-08 | End: 2024-03-14

## 2022-12-08 RX ORDER — ALBUTEROL SULFATE 90 UG/1
1-2 AEROSOL, METERED RESPIRATORY (INHALATION) EVERY 6 HOURS PRN
Qty: 18 G | Refills: 1 | Status: SHIPPED | OUTPATIENT
Start: 2022-12-08 | End: 2024-05-06

## 2022-12-08 ASSESSMENT — ENCOUNTER SYMPTOMS
WEAKNESS: 0
PALPITATIONS: 0
DIZZINESS: 0
HEARTBURN: 0
NERVOUS/ANXIOUS: 0
HEMATOCHEZIA: 0
FREQUENCY: 0
COUGH: 0
DIARRHEA: 0
CHILLS: 0
SORE THROAT: 0
JOINT SWELLING: 0
HEADACHES: 0
DYSURIA: 0
PARESTHESIAS: 0
FEVER: 0
HEMATURIA: 0
CONSTIPATION: 0
ABDOMINAL PAIN: 0
MYALGIAS: 0
ARTHRALGIAS: 0
NAUSEA: 0
EYE PAIN: 0
SHORTNESS OF BREATH: 0

## 2022-12-08 ASSESSMENT — PAIN SCALES - GENERAL: PAINLEVEL: NO PAIN (0)

## 2022-12-08 NOTE — PROGRESS NOTES
Prior to immunization administration, verified patients identity using patient s name and date of birth. Please see Immunization Activity for additional information.     Screening Questionnaire for Adult Immunization    Are you sick today?   No   Do you have allergies to medications, food, a vaccine component or latex?   No   Have you ever had a serious reaction after receiving a vaccination?   No   Do you have a long-term health problem with heart, lung, kidney, or metabolic disease (e.g., diabetes), asthma, a blood disorder, no spleen, complement component deficiency, a cochlear implant, or a spinal fluid leak?  Are you on long-term aspirin therapy?   Yes   Do you have cancer, leukemia, HIV/AIDS, or any other immune system problem?   No   Do you have a parent, brother, or sister with an immune system problem?   No   In the past 3 months, have you taken medications that affect  your immune system, such as prednisone, other steroids, or anticancer drugs; drugs for the treatment of rheumatoid arthritis, Crohn s disease, or psoriasis; or have you had radiation treatments?   No   Have you had a seizure, or a brain or other nervous system problem?   No   During the past year, have you received a transfusion of blood or blood    products, or been given immune (gamma) globulin or antiviral drug?   No   For women: Are you pregnant or is there a chance you could become       pregnant during the next month?   No   Have you received any vaccinations in the past 4 weeks?   No     Immunization questionnaire was positive for at least one answer.  Notified Dr. Dodge.        Per orders of Dr. Dodge, injection of tdap and flu given by Letitia Burgess MA. Patient instructed to remain in clinic for 15 minutes afterwards, and to report any adverse reaction to me immediately.       Screening performed by Letitia Burgess MA on 12/8/2022 at 2:20 PM.

## 2022-12-08 NOTE — PROGRESS NOTES
SUBJECTIVE:   CC: Magnus is an 32 year old who presents for preventative health visit.     Patient has been advised of split billing requirements and indicates understanding: Yes  Healthy Habits:     Getting at least 3 servings of Calcium per day:  Yes    Bi-annual eye exam:  Yes    Dental care twice a year:  NO    Sleep apnea or symptoms of sleep apnea:  None    Diet:  Regular (no restrictions)    Frequency of exercise:  2-3 days/week    Duration of exercise:  15-30 minutes    Taking medications regularly:  Yes    Medication side effects:  None    PHQ-2 Total Score: 0    Additional concerns today:  No              Today's PHQ-2 Score:   PHQ-2 ( 1999 Pfizer) 12/5/2022   Q1: Little interest or pleasure in doing things 0   Q2: Feeling down, depressed or hopeless 0   PHQ-2 Score 0   PHQ-2 Total Score (12-17 Years)- Positive if 3 or more points; Administer PHQ-A if positive -   Q1: Little interest or pleasure in doing things Not at all   Q2: Feeling down, depressed or hopeless Not at all   PHQ-2 Score 0           Social History     Tobacco Use     Smoking status: Never     Smokeless tobacco: Never   Substance Use Topics     Alcohol use: Yes     Comment: 1-2/week         Alcohol Use 12/5/2022   Prescreen: >3 drinks/day or >7 drinks/week? No   Prescreen: >3 drinks/day or >7 drinks/week? -       Reviewed orders with patient. Reviewed health maintenance and updated orders accordingly - Yes      Reviewed and updated as needed this visit by clinical staff   Tobacco  Allergies  Meds              Reviewed and updated as needed this visit by Provider                     Review of Systems   Constitutional: Negative for chills and fever.   HENT: Negative for congestion, ear pain, hearing loss and sore throat.    Eyes: Negative for pain and visual disturbance.   Respiratory: Negative for cough and shortness of breath.    Cardiovascular: Negative for chest pain, palpitations and peripheral edema.   Gastrointestinal: Negative for  "abdominal pain, constipation, diarrhea, heartburn, hematochezia and nausea.   Genitourinary: Negative for dysuria, frequency, genital sores, hematuria, impotence, penile discharge and urgency.   Musculoskeletal: Negative for arthralgias, joint swelling and myalgias.   Skin: Negative for rash.   Neurological: Negative for dizziness, weakness, headaches and paresthesias.   Psychiatric/Behavioral: Negative for mood changes. The patient is not nervous/anxious.          OBJECTIVE:   /64   Pulse 92   Temp 98.8  F (37.1  C) (Temporal)   Ht 1.854 m (6' 1\")   Wt 97.1 kg (214 lb 2 oz)   SpO2 98%   BMI 28.25 kg/m      Physical Exam  Constitutional:       General: He is not in acute distress.     Appearance: He is well-developed.   HENT:      Head: Normocephalic and atraumatic.      Right Ear: Hearing, tympanic membrane, ear canal and external ear normal.      Left Ear: Hearing, tympanic membrane, ear canal and external ear normal.      Nose: Nose normal.      Mouth/Throat:      Mouth: No oral lesions.      Pharynx: Uvula midline. No oropharyngeal exudate.   Eyes:      General: Lids are normal. No scleral icterus.        Right eye: No discharge.         Left eye: No discharge.      Extraocular Movements: Extraocular movements intact.      Conjunctiva/sclera: Conjunctivae normal.      Pupils: Pupils are equal, round, and reactive to light.   Neck:      Thyroid: No thyroid mass or thyromegaly.      Trachea: No tracheal deviation.   Cardiovascular:      Rate and Rhythm: Normal rate and regular rhythm.      Pulses: Normal pulses.      Heart sounds: Normal heart sounds, S1 normal and S2 normal. No murmur heard.    No S3 or S4 sounds.   Pulmonary:      Effort: Pulmonary effort is normal. No respiratory distress.      Breath sounds: Normal breath sounds. No wheezing or rales.   Abdominal:      General: Bowel sounds are normal. There is no distension.      Palpations: Abdomen is soft. There is no mass.      Tenderness: " There is no abdominal tenderness. There is no guarding.   Musculoskeletal:         General: No deformity. Normal range of motion.      Cervical back: Normal range of motion and neck supple.   Lymphadenopathy:      Cervical: No cervical adenopathy.      Upper Body:      Right upper body: No supraclavicular adenopathy.      Left upper body: No supraclavicular adenopathy.   Skin:     General: Skin is warm and dry.      Findings: No lesion or rash.   Neurological:      Mental Status: He is alert and oriented to person, place, and time.      Motor: No abnormal muscle tone.      Deep Tendon Reflexes: Reflexes are normal and symmetric.   Psychiatric:         Speech: Speech normal.         Thought Content: Thought content normal.         Judgment: Judgment normal.               ASSESSMENT/PLAN:       ICD-10-CM    1. Routine general medical examination at a health care facility  Z00.00       2. Seasonal allergic rhinitis due to pollen  J30.1 fluticasone (FLONASE) 50 MCG/ACT nasal spray      3. Mild persistent asthma without complication  J45.30 fluticasone (ARNUITY ELLIPTA) 100 MCG/ACT inhaler     albuterol (PROAIR HFA/PROVENTIL HFA/VENTOLIN HFA) 108 (90 Base) MCG/ACT inhaler      4. Need for prophylactic vaccination and inoculation against influenza  Z23 INFLUENZA VACCINE IM > 6 MONTHS VALENT IIV4 (AFLURIA/FLUZONE)                COUNSELING:   Reviewed preventive health counseling, as reflected in patient instructions        He reports that he has never smoked. He has never used smokeless tobacco.            Omid Dodge MD  [facility]

## 2023-05-07 NOTE — PROGRESS NOTES
[facility]    Outpatient Physical Therapy Discharge Note  Patient: Garrison Hastings  : 1990    Beginning/End Dates of Reporting Period:  8/15/1022 to 2023    Referring Provider: Dr. César Lujan    Therapy Diagnosis: Weakness of hips, somatic dysfunctions of SI joint line, poor posture, instability of R ankle, poor foot position on R side.     Client Self Report: Ever since last visit has barely felt any pain, tightness, or symptoms in the foot.    Objective Measurements:  Objective Measure: Palpation  Details: Improved hip flexor tightness.    Goals:  Goal Identifier #1   Goal Description Pt will report no R LE symptoms with driving or sleeping in order to demonstrate improvement in symptoms with daily activities.   Target Date 22   Date Met  22   Progress (detail required for progress note): No symptosm in the R leg with either driving or sleeping.  Not waking up to pain.     Goal Identifier #2   Goal Description Pt will demonstrate ability to go a full work day with <2/10 back symptoms and no leg symptoms in order to be able to work without distraction of pain.   Target Date 22   Date Met  09/15/22   Progress (detail required for progress note): No pain with a full work day even with more labor intensive activities.     Goal Identifier #3   Goal Description Pt will demonstrate ability to return to PLOF without back or R LE symptoms while performing lifting and carrying tasks at work.   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     Plan:  Discharge from therapy.    Discharge:    Reason for Discharge: Patient has met all goals.    Equipment Issued: Theraband    Discharge Plan: Patient to continue home program.    Thank you for your referral.    Lara Jackson,  PT, DPT  [facility]  [phone]

## 2023-08-11 ENCOUNTER — MYC MEDICAL ADVICE (OUTPATIENT)
Dept: FAMILY MEDICINE | Facility: CLINIC | Age: 33
End: 2023-08-11
Payer: COMMERCIAL

## 2023-08-11 ENCOUNTER — PATIENT OUTREACH (OUTPATIENT)
Dept: FAMILY MEDICINE | Facility: CLINIC | Age: 33
End: 2023-08-11
Payer: COMMERCIAL

## 2023-08-15 ASSESSMENT — ASTHMA QUESTIONNAIRES: ACT_TOTALSCORE: 22

## 2023-11-08 ENCOUNTER — PATIENT OUTREACH (OUTPATIENT)
Dept: CARE COORDINATION | Facility: CLINIC | Age: 33
End: 2023-11-08
Payer: COMMERCIAL

## 2024-02-10 ENCOUNTER — HEALTH MAINTENANCE LETTER (OUTPATIENT)
Age: 34
End: 2024-02-10

## 2024-03-14 ENCOUNTER — MYC REFILL (OUTPATIENT)
Dept: FAMILY MEDICINE | Facility: CLINIC | Age: 34
End: 2024-03-14
Payer: COMMERCIAL

## 2024-03-14 DIAGNOSIS — J45.30 MILD PERSISTENT ASTHMA WITHOUT COMPLICATION: ICD-10-CM

## 2024-03-14 DIAGNOSIS — J30.1 SEASONAL ALLERGIC RHINITIS DUE TO POLLEN: ICD-10-CM

## 2024-03-15 RX ORDER — FLUTICASONE PROPIONATE 50 MCG
2 SPRAY, SUSPENSION (ML) NASAL DAILY
Qty: 48 G | Refills: 0 | Status: SHIPPED | OUTPATIENT
Start: 2024-03-15 | End: 2024-05-06

## 2024-03-15 NOTE — TELEPHONE ENCOUNTER
Medication refilled x1.  Needs appointment for further refills.  Will have staff notify patient, and if appointment is made, next refill will cover patient to the date of the scheduled appointment.    Omid Dodge MD

## 2024-05-06 ENCOUNTER — MYC REFILL (OUTPATIENT)
Dept: FAMILY MEDICINE | Facility: CLINIC | Age: 34
End: 2024-05-06
Payer: COMMERCIAL

## 2024-05-06 DIAGNOSIS — J45.30 MILD PERSISTENT ASTHMA WITHOUT COMPLICATION: ICD-10-CM

## 2024-05-06 DIAGNOSIS — J30.1 SEASONAL ALLERGIC RHINITIS DUE TO POLLEN: ICD-10-CM

## 2024-05-07 RX ORDER — FLUTICASONE PROPIONATE 50 MCG
2 SPRAY, SUSPENSION (ML) NASAL DAILY
Qty: 48 G | Refills: 0 | Status: SHIPPED | OUTPATIENT
Start: 2024-05-07 | End: 2024-09-06

## 2024-05-07 RX ORDER — ALBUTEROL SULFATE 90 UG/1
1-2 AEROSOL, METERED RESPIRATORY (INHALATION) EVERY 6 HOURS PRN
Qty: 18 G | Refills: 1 | Status: SHIPPED | OUTPATIENT
Start: 2024-05-07 | End: 2024-09-06

## 2024-06-05 ENCOUNTER — PATIENT OUTREACH (OUTPATIENT)
Dept: CARE COORDINATION | Facility: CLINIC | Age: 34
End: 2024-06-05
Payer: COMMERCIAL

## 2024-06-28 ENCOUNTER — PATIENT OUTREACH (OUTPATIENT)
Dept: CARE COORDINATION | Facility: CLINIC | Age: 34
End: 2024-06-28
Payer: COMMERCIAL

## 2024-09-01 ASSESSMENT — ASTHMA QUESTIONNAIRES
QUESTION_4 LAST FOUR WEEKS HOW OFTEN HAVE YOU USED YOUR RESCUE INHALER OR NEBULIZER MEDICATION (SUCH AS ALBUTEROL): TWO OR THREE TIMES PER WEEK
QUESTION_3 LAST FOUR WEEKS HOW OFTEN DID YOUR ASTHMA SYMPTOMS (WHEEZING, COUGHING, SHORTNESS OF BREATH, CHEST TIGHTNESS OR PAIN) WAKE YOU UP AT NIGHT OR EARLIER THAN USUAL IN THE MORNING: NOT AT ALL
QUESTION_1 LAST FOUR WEEKS HOW MUCH OF THE TIME DID YOUR ASTHMA KEEP YOU FROM GETTING AS MUCH DONE AT WORK, SCHOOL OR AT HOME: A LITTLE OF THE TIME
ACT_TOTALSCORE: 19
QUESTION_5 LAST FOUR WEEKS HOW WOULD YOU RATE YOUR ASTHMA CONTROL: SOMEWHAT CONTROLLED
QUESTION_2 LAST FOUR WEEKS HOW OFTEN HAVE YOU HAD SHORTNESS OF BREATH: ONCE OR TWICE A WEEK
ACT_TOTALSCORE: 19

## 2024-09-01 ASSESSMENT — SOCIAL DETERMINANTS OF HEALTH (SDOH): HOW OFTEN DO YOU GET TOGETHER WITH FRIENDS OR RELATIVES?: ONCE A WEEK

## 2024-09-06 ENCOUNTER — OFFICE VISIT (OUTPATIENT)
Dept: FAMILY MEDICINE | Facility: CLINIC | Age: 34
End: 2024-09-06
Payer: COMMERCIAL

## 2024-09-06 VITALS
HEART RATE: 76 BPM | DIASTOLIC BLOOD PRESSURE: 74 MMHG | OXYGEN SATURATION: 96 % | RESPIRATION RATE: 16 BRPM | WEIGHT: 217 LBS | BODY MASS INDEX: 28.76 KG/M2 | SYSTOLIC BLOOD PRESSURE: 114 MMHG | HEIGHT: 73 IN | TEMPERATURE: 97.9 F

## 2024-09-06 DIAGNOSIS — Z00.00 ROUTINE GENERAL MEDICAL EXAMINATION AT A HEALTH CARE FACILITY: Primary | ICD-10-CM

## 2024-09-06 DIAGNOSIS — Z13.1 SCREENING FOR DIABETES MELLITUS: ICD-10-CM

## 2024-09-06 DIAGNOSIS — J45.40 MODERATE PERSISTENT ASTHMA WITHOUT COMPLICATION: ICD-10-CM

## 2024-09-06 DIAGNOSIS — H92.01 DISCOMFORT OF RIGHT EAR: ICD-10-CM

## 2024-09-06 DIAGNOSIS — J30.1 SEASONAL ALLERGIC RHINITIS DUE TO POLLEN: ICD-10-CM

## 2024-09-06 DIAGNOSIS — H93.11 TINNITUS, RIGHT: ICD-10-CM

## 2024-09-06 LAB
FASTING STATUS PATIENT QL REPORTED: NO
GLUCOSE SERPL-MCNC: 89 MG/DL (ref 70–99)

## 2024-09-06 PROCEDURE — 82947 ASSAY GLUCOSE BLOOD QUANT: CPT | Performed by: FAMILY MEDICINE

## 2024-09-06 PROCEDURE — 99214 OFFICE O/P EST MOD 30 MIN: CPT | Mod: 25 | Performed by: FAMILY MEDICINE

## 2024-09-06 PROCEDURE — 99395 PREV VISIT EST AGE 18-39: CPT | Mod: 25 | Performed by: FAMILY MEDICINE

## 2024-09-06 PROCEDURE — 90471 IMMUNIZATION ADMIN: CPT | Performed by: FAMILY MEDICINE

## 2024-09-06 PROCEDURE — 90677 PCV20 VACCINE IM: CPT | Performed by: FAMILY MEDICINE

## 2024-09-06 PROCEDURE — 36415 COLL VENOUS BLD VENIPUNCTURE: CPT | Performed by: FAMILY MEDICINE

## 2024-09-06 RX ORDER — FLUTICASONE PROPIONATE 50 MCG
2 SPRAY, SUSPENSION (ML) NASAL DAILY
Qty: 48 G | Refills: 4 | Status: SHIPPED | OUTPATIENT
Start: 2024-09-06

## 2024-09-06 RX ORDER — BUDESONIDE AND FORMOTEROL FUMARATE DIHYDRATE 160; 4.5 UG/1; UG/1
AEROSOL RESPIRATORY (INHALATION)
Qty: 20.4 G | Refills: 11 | Status: SHIPPED | OUTPATIENT
Start: 2024-09-06

## 2024-09-06 RX ORDER — INHALER, ASSIST DEVICES
SPACER (EA) MISCELLANEOUS
Qty: 1 EACH | Refills: 3 | Status: SHIPPED | OUTPATIENT
Start: 2024-09-06

## 2024-09-06 RX ORDER — ALBUTEROL SULFATE 90 UG/1
1-2 AEROSOL, METERED RESPIRATORY (INHALATION) EVERY 6 HOURS PRN
Qty: 18 G | Refills: 1 | Status: SHIPPED | OUTPATIENT
Start: 2024-09-06

## 2024-09-06 ASSESSMENT — PAIN SCALES - GENERAL: PAINLEVEL: NO PAIN (0)

## 2024-09-06 NOTE — PATIENT INSTRUCTIONS
Patient Education   Preventive Care Advice   This is general advice given by our system to help you stay healthy. However, your care team may have specific advice just for you. Please talk to your care team about your preventive care needs.  Nutrition  Eat 5 or more servings of fruits and vegetables each day.  Try wheat bread, brown rice and whole grain pasta (instead of white bread, rice, and pasta).  Get enough calcium and vitamin D. Check the label on foods and aim for 100% of the RDA (recommended daily allowance).  Lifestyle  Exercise at least 150 minutes each week  (30 minutes a day, 5 days a week).  Do muscle strengthening activities 2 days a week. These help control your weight and prevent disease.  No smoking.  Wear sunscreen to prevent skin cancer.  Have a dental exam and cleaning every 6 months.  Yearly exams  See your health care team every year to talk about:  Any changes in your health.  Any medicines your care team has prescribed.  Preventive care, family planning, and ways to prevent chronic diseases.  Shots (vaccines)   HPV shots (up to age 26), if you've never had them before.  Hepatitis B shots (up to age 59), if you've never had them before.  COVID-19 shot: Get this shot when it's due.  Flu shot: Get a flu shot every year.  Tetanus shot: Get a tetanus shot every 10 years.  Pneumococcal, hepatitis A, and RSV shots: Ask your care team if you need these based on your risk.  Shingles shot (for age 50 and up)  General health tests  Diabetes screening:  Starting at age 35, Get screened for diabetes at least every 3 years.  If you are younger than age 35, ask your care team if you should be screened for diabetes.  Cholesterol test: At age 39, start having a cholesterol test every 5 years, or more often if advised.  Bone density scan (DEXA): At age 50, ask your care team if you should have this scan for osteoporosis (brittle bones).  Hepatitis C: Get tested at least once in your life.  STIs (sexually  transmitted infections)  Before age 24: Ask your care team if you should be screened for STIs.  After age 24: Get screened for STIs if you're at risk. You are at risk for STIs (including HIV) if:  You are sexually active with more than one person.  You don't use condoms every time.  You or a partner was diagnosed with a sexually transmitted infection.  If you are at risk for HIV, ask about PrEP medicine to prevent HIV.  Get tested for HIV at least once in your life, whether you are at risk for HIV or not.  Cancer screening tests  Cervical cancer screening: If you have a cervix, begin getting regular cervical cancer screening tests starting at age 21.  Breast cancer scan (mammogram): If you've ever had breasts, begin having regular mammograms starting at age 40. This is a scan to check for breast cancer.  Colon cancer screening: It is important to start screening for colon cancer at age 45.  Have a colonoscopy test every 10 years (or more often if you're at risk) Or, ask your provider about stool tests like a FIT test every year or Cologuard test every 3 years.  To learn more about your testing options, visit:   .  For help making a decision, visit:   https://bit.ly/fq31879.  Prostate cancer screening test: If you have a prostate, ask your care team if a prostate cancer screening test (PSA) at age 55 is right for you.  Lung cancer screening: If you are a current or former smoker ages 50 to 80, ask your care team if ongoing lung cancer screenings are right for you.  For informational purposes only. Not to replace the advice of your health care provider. Copyright   2023 [employer]. All rights reserved. Clinically reviewed by the [facility] Transitions Program. [employer] [id number] - REV 01/24.

## 2024-09-06 NOTE — PROGRESS NOTES
"Preventive Care Visit  [facility]  Omid Dodge MD, Family Medicine  Sep 6, 2024      Assessment & Plan     ASSESSMENT/ORDERS:    ICD-10-CM    1. Routine general medical examination at a health care facility  Z00.00       2. Moderate persistent asthma without complication  J45.40 spacer (OPTICHAMBER BANDAR) holding chamber     budesonide-formoterol (SYMBICORT) 160-4.5 MCG/ACT Inhaler     albuterol (PROAIR HFA/PROVENTIL HFA/VENTOLIN HFA) 108 (90 Base) MCG/ACT inhaler      3. Seasonal allergic rhinitis due to pollen  J30.1 fluticasone (FLONASE) 50 MCG/ACT nasal spray      4. Tinnitus, right  H93.11 Adult ENT  Referral     Adult Audiology  Referral      5. Discomfort of right ear  H92.01 Adult ENT  Referral     Adult Audiology  Referral      6. Screening for diabetes mellitus  Z13.1 Glucose     Glucose        PLAN:  Changed patient's controller medicine to Symbicort via SMART therapy.  He will start with 1 puff twice daily baseline for Symbicort and can increase to 2 puffs twice daily.  We will send out an asthma control test via [employer] in a little over a month to assess his improvement with asthma control.  He will also contact us if he has any questions or issues with the new medication.  Referral to ENT and audiology for further evaluation of tinnitus and of the ear symptoms in his right ear that he is having.            BMI  Estimated body mass index is 28.79 kg/m  as calculated from the following:    Height as of this encounter: 1.849 m (6' 0.8\").    Weight as of this encounter: 98.4 kg (217 lb).       Counseling  Appropriate preventive services were addressed with this patient via screening, questionnaire, or discussion as appropriate for fall prevention, nutrition, physical activity, Tobacco-use cessation, social engagement, weight loss and cognition.  Checklist reviewing preventive services available has been given to the " patient.  Reviewed patient's diet, addressing concerns and/or questions.   He is at risk for lack of exercise and has been provided with information to increase physical activity for the benefit of his well-being.   The patient was instructed to see the dentist every 6 months.           Subjective   Magnus is a 34 year old, presenting for the following:  Physical        9/6/2024     2:05 PM   Additional Questions   Roomed by Tabitha ABRAMS        Health Care Directive  Patient does not have a Health Care Directive or Living Will: Discussed advance care planning with patient; information given to patient to review.    HPI  Patient notes that his asthma management is not as well-controlled as it used to be.  It has gotten worse over the last couple months.        9/1/2024     9:05 AM   ACT Total Scores   ACT TOTAL SCORE (Goal Greater than or Equal to 20) 19   In the past 12 months, how many times did you visit the emergency room for your asthma without being admitted to the hospital? 0   In the past 12 months, how many times were you hospitalized overnight because of your asthma? 0     He is wondering about changing his controller medicine to an increased dose of the Arnuity Ellipta.  He is currently on 100 mcg daily.    Patient notes for the last 3 to 4 years he has been having intermittent tinnitus of his right ear.  He also notes that if he covers his right ear or lucie his right ear and a pillow and there is loud noises coming to his left ear that he starts having ear discomfort and a pulling sensation on the inside of his right ear.  This has been quite bothersome and distressing to him.  He continues to use his Flonase for seasonal rhinitis and does not feel like there is an allergic seasonal component to this right ear issue.            9/1/2024   General Health   How would you rate your overall physical health? Good   Feel stress (tense, anxious, or unable to sleep) To some extent      (!) STRESS CONCERN      9/1/2024    Nutrition   Three or more servings of calcium each day? Yes   Diet: Regular (no restrictions)   How many servings of fruit and vegetables per day? (!) 2-3   How many sweetened beverages each day? 0-1            9/1/2024   Exercise   Days per week of moderate/strenous exercise 3 days   Average minutes spent exercising at this level 20 min            9/1/2024   Social Factors   Frequency of gathering with friends or relatives Once a week   Worry food won't last until get money to buy more No   Food not last or not have enough money for food? No   Do you have housing? (Housing is defined as stable permanent housing and does not include staying ouside in a car, in a tent, in an abandoned building, in an overnight shelter, or couch-surfing.) Yes   Are you worried about losing your housing? No   Lack of transportation? No   Unable to get utilities (heat,electricity)? No            9/1/2024   Dental   Dentist two times every year? (!) NO            9/1/2024   TB Screening   Were you born outside of the [place]? No            Today's PHQ-2 Score:       9/6/2024     2:03 PM   PHQ-2 ( 1999 Pfizer)   Q1: Little interest or pleasure in doing things 0   Q2: Feeling down, depressed or hopeless 0   PHQ-2 Score 0   Q1: Little interest or pleasure in doing things Not at all   Q2: Feeling down, depressed or hopeless Not at all   PHQ-2 Score 0           9/1/2024   Substance Use   Alcohol more than 3/day or more than 7/wk No   Do you use any other substances recreationally? No        Social History     Tobacco Use    Smoking status: Never    Smokeless tobacco: Never   Vaping Use    Vaping status: Never Used   Substance Use Topics    Alcohol use: Yes     Comment: 1-2/week    Drug use: Never           9/1/2024   STI Screening   New sexual partner(s) since last STI/HIV test? No            9/1/2024   Contraception/Family Planning   Questions about contraception or family planning No           Reviewed and updated as needed this visit by  "Provider   Tobacco  Allergies  Meds  Problems  Med Hx  Surg Hx  Fam Hx                     Objective    Exam  /74   Pulse 76   Temp 97.9  F (36.6  C) (Temporal)   Resp 16   Ht 1.849 m (6' 0.8\")   Wt 98.4 kg (217 lb)   SpO2 96%   BMI 28.79 kg/m     Estimated body mass index is 28.79 kg/m  as calculated from the following:    Height as of this encounter: 1.849 m (6' 0.8\").    Weight as of this encounter: 98.4 kg (217 lb).    Physical Exam  Constitutional:       General: He is not in acute distress.     Appearance: He is well-developed.   HENT:      Head: Normocephalic and atraumatic.      Right Ear: Hearing, tympanic membrane, ear canal and external ear normal.      Left Ear: Hearing, tympanic membrane, ear canal and external ear normal.      Nose: Nose normal.      Mouth/Throat:      Mouth: No oral lesions.      Pharynx: Uvula midline. No oropharyngeal exudate.   Eyes:      General: Lids are normal. No scleral icterus.        Right eye: No discharge.         Left eye: No discharge.      Extraocular Movements: Extraocular movements intact.      Conjunctiva/sclera: Conjunctivae normal.      Pupils: Pupils are equal, round, and reactive to light.   Neck:      Thyroid: No thyroid mass or thyromegaly.      Trachea: No tracheal deviation.   Cardiovascular:      Rate and Rhythm: Normal rate and regular rhythm.      Pulses: Normal pulses.      Heart sounds: Normal heart sounds, S1 normal and S2 normal. No murmur heard.     No S3 or S4 sounds.   Pulmonary:      Effort: Pulmonary effort is normal. No respiratory distress.      Breath sounds: Normal breath sounds. No wheezing or rales.   Abdominal:      General: Bowel sounds are normal. There is no distension.      Palpations: Abdomen is soft. There is no mass.      Tenderness: There is no abdominal tenderness. There is no guarding.   Musculoskeletal:         General: No deformity. Normal range of motion.      Cervical back: Normal range of motion and neck " supple.   Lymphadenopathy:      Cervical: No cervical adenopathy.      Upper Body:      Right upper body: No supraclavicular adenopathy.      Left upper body: No supraclavicular adenopathy.   Skin:     General: Skin is warm and dry.      Findings: No lesion or rash.   Neurological:      Mental Status: He is alert and oriented to person, place, and time.      Motor: No abnormal muscle tone.      Deep Tendon Reflexes: Reflexes are normal and symmetric.   Psychiatric:         Speech: Speech normal.         Thought Content: Thought content normal.         Judgment: Judgment normal.               Signed Electronically by: Omid Dodge MD

## 2024-11-25 NOTE — PROGRESS NOTES
"Chief Complaint   Patient presents with    Consult     Tinnitus-right ear     History of Present Illness  Garrison Hastings is a 34 year old male who presents today for evaluation of right tinnitus. He has a health history of moderate persistent asthma and season allergic rhinitis.      The patient has noted ringing bilaterally. The patient notices that when he covers his right ear he hears a fluttering sound. When he is not covering his ear and he is reading a book. He intermittently hears/feels a stretching sensation when reading words. When asked if his symptoms pulsated at first he denied it. Then he described the stretching feeling as a \"whooshing\" sound.  It has been present and noticeable for approximately 2 years.  It was not sudden in onset.  The patient has noticed increased difficulty hearing certain sounds and difficulty in understanding others, especially in noisy or crowded situations.  There is no history of recent head trauma,. He does have a history of multiple concussions (4-5). He had tubes when he was a child with a lot of ear infections but at the age of 9 these symptoms subsided.  chronic ear disease or ear surgery.  The patient reports no exposure recreational, , and work-related noise exposure.  He wears hearing protection. The patient's father has experienced hearing loss in his 40s and recently got hearing aids in the past two years in his 60s.  No regular use of aspirin or NSAIDS.     He does mention that he has been experiencing daily headaches that can last up to two hours. He feels aural fullness in the right ear. Denies dizziness or sound/light changes.     Past Medical History  Patient Active Problem List   Diagnosis    Moderate persistent asthma without complication    Seasonal allergic rhinitis due to pollen     Current Medications     Current Outpatient Medications:     albuterol (PROAIR HFA/PROVENTIL HFA/VENTOLIN HFA) 108 (90 Base) MCG/ACT inhaler, Inhale 1-2 puffs into " the lungs every 6 hours as needed for shortness of breath or wheezing., Disp: 18 g, Rfl: 1    budesonide-formoterol (SYMBICORT) 160-4.5 MCG/ACT Inhaler, Inhale 2 puffs twice daily plus 1-2 puffs as needed. May use up to 12 puffs per day., Disp: 20.4 g, Rfl: 11    cetirizine (ZYRTEC) 10 MG tablet, Take 10 mg by mouth daily, Disp: , Rfl:     fluticasone (FLONASE) 50 MCG/ACT nasal spray, Spray 2 sprays into both nostrils daily., Disp: 48 g, Rfl: 4    spacer (OPTICHAMBER BANDAR) holding chamber, Use as needed with albuterol inhaler., Disp: 1 each, Rfl: 3    Allergies  No Known Allergies    Social History   Social History     Socioeconomic History    Marital status:     Number of children: 0   Tobacco Use    Smoking status: Never    Smokeless tobacco: Never   Vaping Use    Vaping status: Never Used   Substance and Sexual Activity    Alcohol use: Yes     Comment: 1-2/week    Drug use: Never    Sexual activity: Yes     Partners: Female     Birth control/protection: Condom, None   Other Topics Concern    Parent/sibling w/ CABG, MI or angioplasty before 65F 55M? No     Social Drivers of Health     Financial Resource Strain: Low Risk  (9/1/2024)    Financial Resource Strain     Within the past 12 months, have you or your family members you live with been unable to get utilities (heat, electricity) when it was really needed?: No   Food Insecurity: Low Risk  (9/1/2024)    Food Insecurity     Within the past 12 months, did you worry that your food would run out before you got money to buy more?: No     Within the past 12 months, did the food you bought just not last and you didn t have money to get more?: No   Transportation Needs: Low Risk  (9/1/2024)    Transportation Needs     Within the past 12 months, has lack of transportation kept you from medical appointments, getting your medicines, non-medical meetings or appointments, work, or from getting things that you need?: No   Physical Activity: Insufficiently Active  "(9/1/2024)    Exercise Vital Sign     Days of Exercise per Week: 3 days     Minutes of Exercise per Session: 20 min   Stress: Stress Concern Present (9/1/2024)    [employer] - Occupational Stress Questionnaire     Feeling of Stress : To some extent   Social Connections: Unknown (9/1/2024)    Social Connection and Isolation Panel [NHANES]     Frequency of Social Gatherings with Friends and Family: Once a week   Interpersonal Safety: Low Risk  (9/6/2024)    Interpersonal Safety     Do you feel physically and emotionally safe where you currently live?: Yes     Within the past 12 months, have you been hit, slapped, kicked or otherwise physically hurt by someone?: No     Within the past 12 months, have you been humiliated or emotionally abused in other ways by your partner or ex-partner?: No   Housing Stability: Low Risk  (9/1/2024)    Housing Stability     Do you have housing? : Yes     Are you worried about losing your housing?: No       Family History  Family History   Problem Relation Age of Onset    Diabetes Father     Hyperlipidemia Father     Coronary Artery Disease No family hx of     Colon Cancer No family hx of     Prostate Cancer No family hx of        Review of Systems  As per HPI and PMHx, otherwise 10+ comprehensive system review is negative.    Physical Exam  /84   Temp (!) 96.7  F (35.9  C) (Temporal)   Ht 1.849 m (6' 0.8\")   Wt 100 kg (220 lb 6.4 oz)   BMI 29.24 kg/m    GENERAL: Patient is a pleasant, cooperative 34 year old male in no acute distress.  HEAD: Normocephalic, atraumatic.  Hair and scalp are normal.  EYES: Pupils are equal, round, reactive to light and accommodation.  Extraocular movements are intact.  The sclera nonicteric without injection.  The extraocular structures are normal.  EARS: Normal shape and symmetry.  No tenderness when palpating the mastoid or tragal areas bilaterally.  Otoscopic exam reveals a no amount of cerumen bilaterally.  The " bilateral tympanic membranes are round, intact without evidence of effusion, good landmarks.  No retraction, granulation, or drainage.  NOSE: Nares are patent.  Nasal mucosa is pink.  ORAL CAVITY: Dentition is in good repair.  Mucous membranes are moist.  Tongue is mobile, protrudes to the midline.  Palate elevates symmetrically.  Tonsils are +1.  No erythema or exudate.  No oral cavity or oropharyngeal masses, lesions, ulcerations, leukoplakia.  NECK: Supple, trachea is midline.  There no palpable cervical lymphadenopathy or masses bilaterally.  Palpation of the bilateral parotid and submandibular areas reveal no masses.  No thyromegaly.    NEUROLOGIC: Cranial nerves II through XII are grossly intact.  Voice is strong.  Patient is House-Brackmann I/VI bilaterally.  CARDIOVASCULAR: Extremities are warm and well-perfused.  No significant peripheral edema.  RESPIRATORY: Patient has nonlabored breathing without cough, wheeze, stridor.  PSYCHIATRIC: Patient is alert and oriented.  Mood and affect appear normal.  SKIN: Warm and dry.  No scalp, face, or neck lesions noted.    Audiogram  The patient underwent an audiogram performed today.  My review of the audiogram shows normal.  Pure-tone average is 3 dB on the right and 5 dB on the left.  Speech reception threshold is 0 dB on the right and 5 dB on the left.  The patient had 100% word recognition on the right and 100% word recognition on the left.  The patient had a A tympanogram on the right and a A tympanogram on the left.     Assessment and Plan     ICD-10-CM    1. Tinnitus, right  H93.11 Adult ENT  Referral      2. Discomfort of right ear  H92.01 Adult ENT  Referral        It was my pleasure seeing Garrison Hastings today in clinic. Based on patient history, audiogram, and physical examination, in my opinion from an ENT stand point the tinnitus at this time is unlikely related to hearing loss. We did discuss vestibular migraine symptoms. We also  discussed the pathophysiology of of tinnitus and prevention. Please review AVS for patient education. We also discussed a possibility with an abnormality of the vessels in the head or neck due to the fluttering sound he is hearing, therefore I ordered a MRA to rule any abnormality out. I will send a [employer] message or call him with results.     We discussed steps that can be taken to mask the noise, such as a low volume de-tuned radio, a fan in the background, and/or hearing aids.  Correlation with stress, anxiety, and depression were also discussed.  The patient was also cautioned on the numerous expensive non-pharmaceutical options that are advertised, and have no proven benefit.    The patient will follow up as necessary for worsening symptoms or changes in symptoms. I have also recommended repeat audiogram in 1 years, or sooner if symptoms warrant.      SHYLA Dahl CNP  Otolaryngology  [facility]

## 2024-11-26 ENCOUNTER — OFFICE VISIT (OUTPATIENT)
Dept: OTOLARYNGOLOGY | Facility: CLINIC | Age: 34
End: 2024-11-26
Attending: FAMILY MEDICINE
Payer: COMMERCIAL

## 2024-11-26 ENCOUNTER — OFFICE VISIT (OUTPATIENT)
Dept: AUDIOLOGY | Facility: CLINIC | Age: 34
End: 2024-11-26
Attending: FAMILY MEDICINE
Payer: COMMERCIAL

## 2024-11-26 VITALS
HEIGHT: 73 IN | BODY MASS INDEX: 29.21 KG/M2 | TEMPERATURE: 96.7 F | WEIGHT: 220.4 LBS | DIASTOLIC BLOOD PRESSURE: 84 MMHG | SYSTOLIC BLOOD PRESSURE: 126 MMHG

## 2024-11-26 DIAGNOSIS — H93.11 TINNITUS, RIGHT: ICD-10-CM

## 2024-11-26 DIAGNOSIS — H93.11 TINNITUS, RIGHT: Primary | ICD-10-CM

## 2024-11-26 DIAGNOSIS — H92.01 DISCOMFORT OF RIGHT EAR: ICD-10-CM

## 2024-11-26 DIAGNOSIS — H93.A9 PULSATILE TINNITUS: ICD-10-CM

## 2024-11-26 NOTE — LETTER
"11/26/2024      Garrison Hastings  [address] [facility] [address]      Dear Colleague,    Thank you for referring your patient, Garrison Hastings, to the [facility]. Please see a copy of my visit note below.    Chief Complaint   Patient presents with     Consult     Tinnitus-right ear     History of Present Illness  Garriosn Hastings is a 34 year old male who presents today for evaluation of right tinnitus. He has a health history of moderate persistent asthma and season allergic rhinitis.      The patient has noted ringing bilaterally. The patient notices that when he covers his right ear he hears a fluttering sound. When he is not covering his ear and he is reading a book. He intermittently hears/feels a stretching sensation when reading words. When asked if his symptoms pulsated at first he denied it. Then he described the stretching feeling as a \"whooshing\" sound.  It has been present and noticeable for approximately 2 years.  It was not sudden in onset.  The patient has noticed increased difficulty hearing certain sounds and difficulty in understanding others, especially in noisy or crowded situations.  There is no history of recent head trauma,. He does have a history of multiple concussions (4-5). He had tubes when he was a child with a lot of ear infections but at the age of 9 these symptoms subsided.  chronic ear disease or ear surgery.  The patient reports no exposure recreational, , and work-related noise exposure.  He wears hearing protection. The patient's father has experienced hearing loss in his 40s and recently got hearing aids in the past two years in his 60s.  No regular use of aspirin or NSAIDS.     He does mention that he has been experiencing daily headaches that can last up to two hours. He feels aural fullness in the right ear. Denies dizziness or sound/light changes.     Past Medical History  Patient Active Problem List   Diagnosis     Moderate " persistent asthma without complication     Seasonal allergic rhinitis due to pollen     Current Medications     Current Outpatient Medications:      albuterol (PROAIR HFA/PROVENTIL HFA/VENTOLIN HFA) 108 (90 Base) MCG/ACT inhaler, Inhale 1-2 puffs into the lungs every 6 hours as needed for shortness of breath or wheezing., Disp: 18 g, Rfl: 1     budesonide-formoterol (SYMBICORT) 160-4.5 MCG/ACT Inhaler, Inhale 2 puffs twice daily plus 1-2 puffs as needed. May use up to 12 puffs per day., Disp: 20.4 g, Rfl: 11     cetirizine (ZYRTEC) 10 MG tablet, Take 10 mg by mouth daily, Disp: , Rfl:      fluticasone (FLONASE) 50 MCG/ACT nasal spray, Spray 2 sprays into both nostrils daily., Disp: 48 g, Rfl: 4     spacer (OPTICHAMBER BANDAR) holding chamber, Use as needed with albuterol inhaler., Disp: 1 each, Rfl: 3    Allergies  No Known Allergies    Social History   Social History     Socioeconomic History     Marital status:      Number of children: 0   Tobacco Use     Smoking status: Never     Smokeless tobacco: Never   Vaping Use     Vaping status: Never Used   Substance and Sexual Activity     Alcohol use: Yes     Comment: 1-2/week     Drug use: Never     Sexual activity: Yes     Partners: Female     Birth control/protection: Condom, None   Other Topics Concern     Parent/sibling w/ CABG, MI or angioplasty before 65F 55M? No     Social Drivers of Health     Financial Resource Strain: Low Risk  (9/1/2024)    Financial Resource Strain      Within the past 12 months, have you or your family members you live with been unable to get utilities (heat, electricity) when it was really needed?: No   Food Insecurity: Low Risk  (9/1/2024)    Food Insecurity      Within the past 12 months, did you worry that your food would run out before you got money to buy more?: No      Within the past 12 months, did the food you bought just not last and you didn t have money to get more?: No   Transportation Needs: Low Risk  (9/1/2024)     "Transportation Needs      Within the past 12 months, has lack of transportation kept you from medical appointments, getting your medicines, non-medical meetings or appointments, work, or from getting things that you need?: No   Physical Activity: Insufficiently Active (9/1/2024)    Exercise Vital Sign      Days of Exercise per Week: 3 days      Minutes of Exercise per Session: 20 min   Stress: Stress Concern Present (9/1/2024)    [employer] - Occupational Stress Questionnaire      Feeling of Stress : To some extent   Social Connections: Unknown (9/1/2024)    Social Connection and Isolation Panel [NHANES]      Frequency of Social Gatherings with Friends and Family: Once a week   Interpersonal Safety: Low Risk  (9/6/2024)    Interpersonal Safety      Do you feel physically and emotionally safe where you currently live?: Yes      Within the past 12 months, have you been hit, slapped, kicked or otherwise physically hurt by someone?: No      Within the past 12 months, have you been humiliated or emotionally abused in other ways by your partner or ex-partner?: No   Housing Stability: Low Risk  (9/1/2024)    Housing Stability      Do you have housing? : Yes      Are you worried about losing your housing?: No       Family History  Family History   Problem Relation Age of Onset     Diabetes Father      Hyperlipidemia Father      Coronary Artery Disease No family hx of      Colon Cancer No family hx of      Prostate Cancer No family hx of        Review of Systems  As per HPI and PMHx, otherwise 10+ comprehensive system review is negative.    Physical Exam  /84   Temp (!) 96.7  F (35.9  C) (Temporal)   Ht 1.849 m (6' 0.8\")   Wt 100 kg (220 lb 6.4 oz)   BMI 29.24 kg/m    GENERAL: Patient is a pleasant, cooperative 34 year old male in no acute distress.  HEAD: Normocephalic, atraumatic.  Hair and scalp are normal.  EYES: Pupils are equal, round, reactive to light and accommodation.  " Extraocular movements are intact.  The sclera nonicteric without injection.  The extraocular structures are normal.  EARS: Normal shape and symmetry.  No tenderness when palpating the mastoid or tragal areas bilaterally.  Otoscopic exam reveals a no amount of cerumen bilaterally.  The bilateral tympanic membranes are round, intact without evidence of effusion, good landmarks.  No retraction, granulation, or drainage.  NOSE: Nares are patent.  Nasal mucosa is pink.  ORAL CAVITY: Dentition is in good repair.  Mucous membranes are moist.  Tongue is mobile, protrudes to the midline.  Palate elevates symmetrically.  Tonsils are +1.  No erythema or exudate.  No oral cavity or oropharyngeal masses, lesions, ulcerations, leukoplakia.  NECK: Supple, trachea is midline.  There no palpable cervical lymphadenopathy or masses bilaterally.  Palpation of the bilateral parotid and submandibular areas reveal no masses.  No thyromegaly.    NEUROLOGIC: Cranial nerves II through XII are grossly intact.  Voice is strong.  Patient is House-Brackmann I/VI bilaterally.  CARDIOVASCULAR: Extremities are warm and well-perfused.  No significant peripheral edema.  RESPIRATORY: Patient has nonlabored breathing without cough, wheeze, stridor.  PSYCHIATRIC: Patient is alert and oriented.  Mood and affect appear normal.  SKIN: Warm and dry.  No scalp, face, or neck lesions noted.    Audiogram  The patient underwent an audiogram performed today.  My review of the audiogram shows normal.  Pure-tone average is 3 dB on the right and 5 dB on the left.  Speech reception threshold is 0 dB on the right and 5 dB on the left.  The patient had 100% word recognition on the right and 100% word recognition on the left.  The patient had a A tympanogram on the right and a A tympanogram on the left.     Assessment and Plan     ICD-10-CM    1. Tinnitus, right  H93.11 Adult ENT  Referral      2. Discomfort of right ear  H92.01 Adult ENT  Referral         It was my pleasure seeing Garrison Hastings today in clinic. Based on patient history, audiogram, and physical examination, in my opinion from an ENT stand point the tinnitus at this time is unlikely related to hearing loss. We did discuss vestibular migraine symptoms. We also discussed the pathophysiology of of tinnitus and prevention. Please review AVS for patient education. We also discussed a possibility with an abnormality of the vessels in the head or neck due to the fluttering sound he is hearing, therefore I ordered a MRA to rule any abnormality out. I will send a [employer] message or call him with results.     We discussed steps that can be taken to mask the noise, such as a low volume de-tuned radio, a fan in the background, and/or hearing aids.  Correlation with stress, anxiety, and depression were also discussed.  The patient was also cautioned on the numerous expensive non-pharmaceutical options that are advertised, and have no proven benefit.    The patient will follow up as necessary for worsening symptoms or changes in symptoms. I have also recommended repeat audiogram in 1 years, or sooner if symptoms warrant.      SHYLA Dahl CNP  Otolaryngology  [facility]      Again, thank you for allowing me to participate in the care of your patient.        Sincerely,        SHYLA Dahl CNP

## 2024-11-26 NOTE — PROGRESS NOTES
AUDIOLOGY REPORT     SUMMARY: Audiology visit completed. See audiogram for results.       RECOMMENDATIONS: Follow-up with ENT.      Tabitha Kuhn.  Licensed Audiologist   [place] #[id number]

## 2024-11-26 NOTE — PATIENT INSTRUCTIONS
You were seen by Brennan Gagnon CNP.  If you have questions or concerns regarding your appointment today, you can reach out to our call center at [phone].  The following has been recommended at your appointment today:    TINNITUS EDUCATION:   Monitor salt, caffeine, alcohol, dark chocolate intake. Decrease intake to see if symptoms improve.   Decrease use of Aspirin, IBU, and Aleve.   Threshold Shift - Experiencing a threshold shift causes damage to the hair cells in your ears. When they are damaged instead of standing up they remain bend over, even if there is sound. When hair cells remain bent over it can cause a felling of fullness, ringing in the ear or possible temporary hearing loss.   Continue with meditation to help maintain stress, anxiety, or depression symptoms.   May use a sound machine, fan, or sound jluis on your smart phone during sleep. (Called white noise)  [employer] - Sound [place]  Cognitive Behavioral Therapy - Send me a [employer] message if you are interested in this.   OTC Medications you could try include Arches Tinnitus Formula or Lipo Flavonoid.   Could order an MRI for further evaluation to make sure there is nothing growing on the hearing nerve. However, your word recognition is currently at 100%.

## 2025-04-08 ENCOUNTER — TELEPHONE (OUTPATIENT)
Dept: FAMILY MEDICINE | Facility: CLINIC | Age: 35
End: 2025-04-08
Payer: COMMERCIAL

## 2025-04-08 NOTE — TELEPHONE ENCOUNTER
Patient Quality Outreach    Patient is due for the following:   Asthma  -  ACT needed and AAP  Depression  -  phq2    Action(s) Taken:   Patient has upcoming appointment, these items will be addressed at that time.    Type of outreach:    Chart review performed, no outreach needed.    Questions for provider review:    None         Tabitha Capellan  Chart routed to None.

## 2025-05-27 DIAGNOSIS — J45.40 MODERATE PERSISTENT ASTHMA WITHOUT COMPLICATION: ICD-10-CM

## 2025-05-27 RX ORDER — ALBUTEROL SULFATE 90 UG/1
AEROSOL, METERED RESPIRATORY (INHALATION)
Qty: 18 G | Refills: 0 | Status: SHIPPED | OUTPATIENT
Start: 2025-05-27